# Patient Record
Sex: FEMALE | Race: AMERICAN INDIAN OR ALASKA NATIVE | NOT HISPANIC OR LATINO | Employment: UNEMPLOYED | URBAN - METROPOLITAN AREA
[De-identification: names, ages, dates, MRNs, and addresses within clinical notes are randomized per-mention and may not be internally consistent; named-entity substitution may affect disease eponyms.]

---

## 2017-03-20 ENCOUNTER — APPOINTMENT (OUTPATIENT)
Dept: GENERAL RADIOLOGY | Facility: CLINIC | Age: 27
End: 2017-03-20
Attending: NURSE PRACTITIONER
Payer: MEDICAID

## 2017-03-20 ENCOUNTER — APPOINTMENT (OUTPATIENT)
Dept: NUCLEAR MEDICINE | Facility: CLINIC | Age: 27
End: 2017-03-20
Attending: NURSE PRACTITIONER
Payer: MEDICAID

## 2017-03-20 ENCOUNTER — APPOINTMENT (OUTPATIENT)
Dept: ULTRASOUND IMAGING | Facility: CLINIC | Age: 27
End: 2017-03-20
Attending: EMERGENCY MEDICINE
Payer: MEDICAID

## 2017-03-20 ENCOUNTER — HOSPITAL ENCOUNTER (OUTPATIENT)
Facility: CLINIC | Age: 27
Setting detail: OBSERVATION
Discharge: HOME | End: 2017-03-22
Attending: EMERGENCY MEDICINE | Admitting: EMERGENCY MEDICINE
Payer: MEDICAID

## 2017-03-20 DIAGNOSIS — R11.2 INTRACTABLE VOMITING WITH NAUSEA, UNSPECIFIED VOMITING TYPE: ICD-10-CM

## 2017-03-20 DIAGNOSIS — K80.20 CHOLELITHIASIS WITHOUT CHOLECYSTITIS: ICD-10-CM

## 2017-03-20 DIAGNOSIS — R07.9 CHEST PAIN, UNSPECIFIED: ICD-10-CM

## 2017-03-20 DIAGNOSIS — R10.13 EPIGASTRIC PAIN: ICD-10-CM

## 2017-03-20 DIAGNOSIS — R10.11 RIGHT UPPER QUADRANT ABDOMINAL PAIN: Primary | ICD-10-CM

## 2017-03-20 PROBLEM — R10.9 ABDOMINAL PAIN: Status: ACTIVE | Noted: 2017-03-20

## 2017-03-20 LAB
ABO + RH BLD: NORMAL
ABO + RH BLD: NORMAL
ALBUMIN SERPL-MCNC: 3.5 G/DL (ref 3.4–5)
ALBUMIN SERPL-MCNC: 3.8 G/DL (ref 3.4–5)
ALBUMIN SERPL-MCNC: 3.9 G/DL (ref 3.4–5)
ALBUMIN UR-MCNC: NEGATIVE MG/DL
ALP SERPL-CCNC: 106 U/L (ref 40–150)
ALP SERPL-CCNC: 91 U/L (ref 40–150)
ALP SERPL-CCNC: 97 U/L (ref 40–150)
ALT SERPL W P-5'-P-CCNC: 21 U/L (ref 0–50)
ALT SERPL W P-5'-P-CCNC: 21 U/L (ref 0–50)
ALT SERPL W P-5'-P-CCNC: 27 U/L (ref 0–50)
ANION GAP SERPL CALCULATED.3IONS-SCNC: 10 MMOL/L (ref 3–14)
ANION GAP SERPL CALCULATED.3IONS-SCNC: 11 MMOL/L (ref 3–14)
ANION GAP SERPL CALCULATED.3IONS-SCNC: 7 MMOL/L (ref 3–14)
APPEARANCE UR: CLEAR
APTT PPP: 27 SEC (ref 22–37)
AST SERPL W P-5'-P-CCNC: 10 U/L (ref 0–45)
AST SERPL W P-5'-P-CCNC: 16 U/L (ref 0–45)
AST SERPL W P-5'-P-CCNC: 6 U/L (ref 0–45)
B-HCG SERPL-ACNC: <1 IU/L (ref 0–5)
BASOPHILS # BLD AUTO: 0 10E9/L (ref 0–0.2)
BASOPHILS NFR BLD AUTO: 0.1 %
BILIRUB SERPL-MCNC: 0.2 MG/DL (ref 0.2–1.3)
BILIRUB SERPL-MCNC: 0.3 MG/DL (ref 0.2–1.3)
BILIRUB SERPL-MCNC: 0.5 MG/DL (ref 0.2–1.3)
BILIRUB UR QL STRIP: NEGATIVE
BLD GP AB SCN SERPL QL: NORMAL
BLOOD BANK CMNT PATIENT-IMP: NORMAL
BUN SERPL-MCNC: 5 MG/DL (ref 7–30)
BUN SERPL-MCNC: 6 MG/DL (ref 7–30)
BUN SERPL-MCNC: 9 MG/DL (ref 7–30)
CALCIUM SERPL-MCNC: 8.2 MG/DL (ref 8.5–10.1)
CALCIUM SERPL-MCNC: 8.3 MG/DL (ref 8.5–10.1)
CALCIUM SERPL-MCNC: 9.3 MG/DL (ref 8.5–10.1)
CHLORIDE SERPL-SCNC: 106 MMOL/L (ref 94–109)
CHLORIDE SERPL-SCNC: 109 MMOL/L (ref 94–109)
CHLORIDE SERPL-SCNC: 109 MMOL/L (ref 94–109)
CO2 SERPL-SCNC: 22 MMOL/L (ref 20–32)
CO2 SERPL-SCNC: 23 MMOL/L (ref 20–32)
CO2 SERPL-SCNC: 23 MMOL/L (ref 20–32)
COLOR UR AUTO: ABNORMAL
CREAT SERPL-MCNC: 0.56 MG/DL (ref 0.52–1.04)
CREAT SERPL-MCNC: 0.65 MG/DL (ref 0.52–1.04)
CREAT SERPL-MCNC: 0.67 MG/DL (ref 0.52–1.04)
CRP SERPL-MCNC: 12 MG/L (ref 0–8)
CRP SERPL-MCNC: 3.1 MG/L (ref 0–8)
DIFFERENTIAL METHOD BLD: ABNORMAL
EOSINOPHIL # BLD AUTO: 0 10E9/L (ref 0–0.7)
EOSINOPHIL # BLD AUTO: 0 10E9/L (ref 0–0.7)
EOSINOPHIL # BLD AUTO: 0.2 10E9/L (ref 0–0.7)
EOSINOPHIL NFR BLD AUTO: 0 %
EOSINOPHIL NFR BLD AUTO: 0.1 %
EOSINOPHIL NFR BLD AUTO: 1.7 %
ERYTHROCYTE [DISTWIDTH] IN BLOOD BY AUTOMATED COUNT: 12.4 % (ref 10–15)
ERYTHROCYTE [DISTWIDTH] IN BLOOD BY AUTOMATED COUNT: 12.7 % (ref 10–15)
ERYTHROCYTE [DISTWIDTH] IN BLOOD BY AUTOMATED COUNT: 12.8 % (ref 10–15)
ETHANOL SERPL-MCNC: <0.01 G/DL
GFR SERPL CREATININE-BSD FRML MDRD: ABNORMAL ML/MIN/1.7M2
GLUCOSE SERPL-MCNC: 103 MG/DL (ref 70–99)
GLUCOSE SERPL-MCNC: 106 MG/DL (ref 70–99)
GLUCOSE SERPL-MCNC: 122 MG/DL (ref 70–99)
GLUCOSE UR STRIP-MCNC: NEGATIVE MG/DL
HCT VFR BLD AUTO: 38.9 % (ref 35–47)
HCT VFR BLD AUTO: 39.1 % (ref 35–47)
HCT VFR BLD AUTO: 41.4 % (ref 35–47)
HETEROPH AB SER QL: NEGATIVE
HGB BLD-MCNC: 13.2 G/DL (ref 11.7–15.7)
HGB BLD-MCNC: 13.3 G/DL (ref 11.7–15.7)
HGB BLD-MCNC: 14.3 G/DL (ref 11.7–15.7)
HGB UR QL STRIP: NEGATIVE
IMM GRANULOCYTES # BLD: 0 10E9/L (ref 0–0.4)
IMM GRANULOCYTES NFR BLD: 0.2 %
IMM GRANULOCYTES NFR BLD: 0.2 %
IMM GRANULOCYTES NFR BLD: 0.3 %
INR PPP: 1 (ref 0.86–1.14)
INR PPP: 1.05 (ref 0.86–1.14)
INTERPRETATION ECG - MUSE: NORMAL
KETONES UR STRIP-MCNC: NEGATIVE MG/DL
LACTATE BLD-SCNC: 1.6 MMOL/L (ref 0.7–2.1)
LEUKOCYTE ESTERASE UR QL STRIP: NEGATIVE
LIPASE SERPL-CCNC: 116 U/L (ref 73–393)
LIPASE SERPL-CCNC: 120 U/L (ref 73–393)
LIPASE SERPL-CCNC: 177 U/L (ref 73–393)
LYMPHOCYTES # BLD AUTO: 1.1 10E9/L (ref 0.8–5.3)
LYMPHOCYTES # BLD AUTO: 2.4 10E9/L (ref 0.8–5.3)
LYMPHOCYTES # BLD AUTO: 4.4 10E9/L (ref 0.8–5.3)
LYMPHOCYTES NFR BLD AUTO: 15.5 %
LYMPHOCYTES NFR BLD AUTO: 38.3 %
LYMPHOCYTES NFR BLD AUTO: 6 %
MCH RBC QN AUTO: 30.6 PG (ref 26.5–33)
MCH RBC QN AUTO: 30.9 PG (ref 26.5–33)
MCH RBC QN AUTO: 31.3 PG (ref 26.5–33)
MCHC RBC AUTO-ENTMCNC: 33.9 G/DL (ref 31.5–36.5)
MCHC RBC AUTO-ENTMCNC: 34 G/DL (ref 31.5–36.5)
MCHC RBC AUTO-ENTMCNC: 34.5 G/DL (ref 31.5–36.5)
MCV RBC AUTO: 90 FL (ref 78–100)
MCV RBC AUTO: 91 FL (ref 78–100)
MCV RBC AUTO: 91 FL (ref 78–100)
MONOCYTES # BLD AUTO: 0.3 10E9/L (ref 0–1.3)
MONOCYTES # BLD AUTO: 0.4 10E9/L (ref 0–1.3)
MONOCYTES # BLD AUTO: 0.7 10E9/L (ref 0–1.3)
MONOCYTES NFR BLD AUTO: 1.9 %
MONOCYTES NFR BLD AUTO: 3.4 %
MONOCYTES NFR BLD AUTO: 4.2 %
NEUTROPHILS # BLD AUTO: 12.3 10E9/L (ref 1.6–8.3)
NEUTROPHILS # BLD AUTO: 16.6 10E9/L (ref 1.6–8.3)
NEUTROPHILS # BLD AUTO: 6.4 10E9/L (ref 1.6–8.3)
NEUTROPHILS NFR BLD AUTO: 56.2 %
NEUTROPHILS NFR BLD AUTO: 79.9 %
NEUTROPHILS NFR BLD AUTO: 91.8 %
NITRATE UR QL: NEGATIVE
NRBC # BLD AUTO: 0 10*3/UL
NRBC BLD AUTO-RTO: 0 /100
PH UR STRIP: 6.5 PH (ref 5–7)
PLATELET # BLD AUTO: 322 10E9/L (ref 150–450)
PLATELET # BLD AUTO: 347 10E9/L (ref 150–450)
PLATELET # BLD AUTO: 376 10E9/L (ref 150–450)
POTASSIUM SERPL-SCNC: 3.5 MMOL/L (ref 3.4–5.3)
POTASSIUM SERPL-SCNC: 3.8 MMOL/L (ref 3.4–5.3)
POTASSIUM SERPL-SCNC: 3.8 MMOL/L (ref 3.4–5.3)
PROT SERPL-MCNC: 6.8 G/DL (ref 6.8–8.8)
PROT SERPL-MCNC: 7.4 G/DL (ref 6.8–8.8)
PROT SERPL-MCNC: 7.9 G/DL (ref 6.8–8.8)
RADIOLOGIST FLAGS: ABNORMAL
RBC # BLD AUTO: 4.3 10E12/L (ref 3.8–5.2)
RBC # BLD AUTO: 4.31 10E12/L (ref 3.8–5.2)
RBC # BLD AUTO: 4.57 10E12/L (ref 3.8–5.2)
SODIUM SERPL-SCNC: 138 MMOL/L (ref 133–144)
SODIUM SERPL-SCNC: 139 MMOL/L (ref 133–144)
SODIUM SERPL-SCNC: 142 MMOL/L (ref 133–144)
SP GR UR STRIP: 1 (ref 1–1.03)
SPECIMEN EXP DATE BLD: NORMAL
TROPONIN I BLD-MCNC: 0 UG/L (ref 0–0.1)
TROPONIN I SERPL-MCNC: NORMAL UG/L (ref 0–0.04)
URN SPEC COLLECT METH UR: ABNORMAL
UROBILINOGEN UR STRIP-MCNC: NORMAL MG/DL (ref 0–2)
WBC # BLD AUTO: 11.4 10E9/L (ref 4–11)
WBC # BLD AUTO: 15.4 10E9/L (ref 4–11)
WBC # BLD AUTO: 18.1 10E9/L (ref 4–11)

## 2017-03-20 PROCEDURE — 80307 DRUG TEST PRSMV CHEM ANLYZR: CPT | Mod: 91 | Performed by: NURSE PRACTITIONER

## 2017-03-20 PROCEDURE — 83605 ASSAY OF LACTIC ACID: CPT | Performed by: NURSE PRACTITIONER

## 2017-03-20 PROCEDURE — 96376 TX/PRO/DX INJ SAME DRUG ADON: CPT

## 2017-03-20 PROCEDURE — 80320 DRUG SCREEN QUANTALCOHOLS: CPT | Mod: 91 | Performed by: NURSE PRACTITIONER

## 2017-03-20 PROCEDURE — 25000128 H RX IP 250 OP 636: Performed by: NURSE PRACTITIONER

## 2017-03-20 PROCEDURE — 93005 ELECTROCARDIOGRAM TRACING: CPT | Performed by: EMERGENCY MEDICINE

## 2017-03-20 PROCEDURE — 25000128 H RX IP 250 OP 636: Performed by: RADIOLOGY

## 2017-03-20 PROCEDURE — 85610 PROTHROMBIN TIME: CPT | Performed by: EMERGENCY MEDICINE

## 2017-03-20 PROCEDURE — 85730 THROMBOPLASTIN TIME PARTIAL: CPT | Performed by: NURSE PRACTITIONER

## 2017-03-20 PROCEDURE — 80307 DRUG TEST PRSMV CHEM ANLYZR: CPT | Performed by: NURSE PRACTITIONER

## 2017-03-20 PROCEDURE — 99285 EMERGENCY DEPT VISIT HI MDM: CPT | Mod: 25 | Performed by: EMERGENCY MEDICINE

## 2017-03-20 PROCEDURE — 96374 THER/PROPH/DIAG INJ IV PUSH: CPT | Performed by: EMERGENCY MEDICINE

## 2017-03-20 PROCEDURE — 85025 COMPLETE CBC W/AUTO DIFF WBC: CPT | Mod: 91 | Performed by: NURSE PRACTITIONER

## 2017-03-20 PROCEDURE — 25000125 ZZHC RX 250: Performed by: NURSE PRACTITIONER

## 2017-03-20 PROCEDURE — 99207 ZZC APP CREDIT; MD BILLING SHARED VISIT: CPT | Mod: 25 | Performed by: EMERGENCY MEDICINE

## 2017-03-20 PROCEDURE — 34300033 ZZH RX 343: Performed by: FAMILY MEDICINE

## 2017-03-20 PROCEDURE — 96376 TX/PRO/DX INJ SAME DRUG ADON: CPT | Performed by: EMERGENCY MEDICINE

## 2017-03-20 PROCEDURE — 81003 URINALYSIS AUTO W/O SCOPE: CPT | Performed by: NURSE PRACTITIONER

## 2017-03-20 PROCEDURE — 87040 BLOOD CULTURE FOR BACTERIA: CPT | Performed by: NURSE PRACTITIONER

## 2017-03-20 PROCEDURE — 74000 XR ABDOMEN PORT F1 VW: CPT

## 2017-03-20 PROCEDURE — 78226 HEPATOBILIARY SYSTEM IMAGING: CPT

## 2017-03-20 PROCEDURE — 84484 ASSAY OF TROPONIN QUANT: CPT | Performed by: EMERGENCY MEDICINE

## 2017-03-20 PROCEDURE — 86900 BLOOD TYPING SEROLOGIC ABO: CPT | Performed by: NURSE PRACTITIONER

## 2017-03-20 PROCEDURE — 36415 COLL VENOUS BLD VENIPUNCTURE: CPT | Performed by: NURSE PRACTITIONER

## 2017-03-20 PROCEDURE — 96375 TX/PRO/DX INJ NEW DRUG ADDON: CPT

## 2017-03-20 PROCEDURE — 85025 COMPLETE CBC W/AUTO DIFF WBC: CPT | Performed by: EMERGENCY MEDICINE

## 2017-03-20 PROCEDURE — 96361 HYDRATE IV INFUSION ADD-ON: CPT | Performed by: EMERGENCY MEDICINE

## 2017-03-20 PROCEDURE — 25000128 H RX IP 250 OP 636: Performed by: EMERGENCY MEDICINE

## 2017-03-20 PROCEDURE — 84702 CHORIONIC GONADOTROPIN TEST: CPT | Performed by: NURSE PRACTITIONER

## 2017-03-20 PROCEDURE — 25000128 H RX IP 250 OP 636

## 2017-03-20 PROCEDURE — 86140 C-REACTIVE PROTEIN: CPT | Performed by: NURSE PRACTITIONER

## 2017-03-20 PROCEDURE — 84484 ASSAY OF TROPONIN QUANT: CPT

## 2017-03-20 PROCEDURE — A9537 TC99M MEBROFENIN: HCPCS | Performed by: FAMILY MEDICINE

## 2017-03-20 PROCEDURE — 86901 BLOOD TYPING SEROLOGIC RH(D): CPT | Performed by: NURSE PRACTITIONER

## 2017-03-20 PROCEDURE — 85610 PROTHROMBIN TIME: CPT | Mod: 91 | Performed by: NURSE PRACTITIONER

## 2017-03-20 PROCEDURE — 96375 TX/PRO/DX INJ NEW DRUG ADDON: CPT | Performed by: EMERGENCY MEDICINE

## 2017-03-20 PROCEDURE — 83690 ASSAY OF LIPASE: CPT | Performed by: NURSE PRACTITIONER

## 2017-03-20 PROCEDURE — 80053 COMPREHEN METABOLIC PANEL: CPT | Performed by: EMERGENCY MEDICINE

## 2017-03-20 PROCEDURE — 99219 ZZC INITIAL OBSERVATION CARE,LEVL II: CPT | Mod: Z6 | Performed by: NURSE PRACTITIONER

## 2017-03-20 PROCEDURE — 80053 COMPREHEN METABOLIC PANEL: CPT | Mod: 91 | Performed by: NURSE PRACTITIONER

## 2017-03-20 PROCEDURE — 86850 RBC ANTIBODY SCREEN: CPT | Performed by: NURSE PRACTITIONER

## 2017-03-20 PROCEDURE — 83690 ASSAY OF LIPASE: CPT | Performed by: EMERGENCY MEDICINE

## 2017-03-20 PROCEDURE — 76705 ECHO EXAM OF ABDOMEN: CPT

## 2017-03-20 PROCEDURE — 93010 ELECTROCARDIOGRAM REPORT: CPT | Mod: Z6 | Performed by: EMERGENCY MEDICINE

## 2017-03-20 PROCEDURE — 25000125 ZZHC RX 250: Performed by: EMERGENCY MEDICINE

## 2017-03-20 PROCEDURE — G0378 HOSPITAL OBSERVATION PER HR: HCPCS

## 2017-03-20 PROCEDURE — 86308 HETEROPHILE ANTIBODY SCREEN: CPT | Performed by: EMERGENCY MEDICINE

## 2017-03-20 PROCEDURE — 96365 THER/PROPH/DIAG IV INF INIT: CPT

## 2017-03-20 RX ORDER — MORPHINE SULFATE 2 MG/ML
2 INJECTION, SOLUTION INTRAMUSCULAR; INTRAVENOUS ONCE
Status: COMPLETED | OUTPATIENT
Start: 2017-03-20 | End: 2017-03-20

## 2017-03-20 RX ORDER — NALOXONE HYDROCHLORIDE 0.4 MG/ML
.1-.4 INJECTION, SOLUTION INTRAMUSCULAR; INTRAVENOUS; SUBCUTANEOUS
Status: DISCONTINUED | OUTPATIENT
Start: 2017-03-20 | End: 2017-03-22 | Stop reason: HOSPADM

## 2017-03-20 RX ORDER — MORPHINE SULFATE 2 MG/ML
1 INJECTION, SOLUTION INTRAMUSCULAR; INTRAVENOUS
Status: DISCONTINUED | OUTPATIENT
Start: 2017-03-20 | End: 2017-03-20

## 2017-03-20 RX ORDER — SODIUM CHLORIDE 9 MG/ML
1000 INJECTION, SOLUTION INTRAVENOUS CONTINUOUS
Status: DISCONTINUED | OUTPATIENT
Start: 2017-03-20 | End: 2017-03-20

## 2017-03-20 RX ORDER — HYDROCODONE BITARTRATE AND ACETAMINOPHEN 5; 325 MG/1; MG/1
1-2 TABLET ORAL EVERY 4 HOURS PRN
Qty: 15 TABLET | Refills: 0 | Status: SHIPPED | OUTPATIENT
Start: 2017-03-20 | End: 2017-03-22

## 2017-03-20 RX ORDER — ONDANSETRON 2 MG/ML
4 INJECTION INTRAMUSCULAR; INTRAVENOUS EVERY 30 MIN PRN
Status: COMPLETED | OUTPATIENT
Start: 2017-03-20 | End: 2017-03-20

## 2017-03-20 RX ORDER — ACETAMINOPHEN 10 MG/ML
1000 INJECTION, SOLUTION INTRAVENOUS ONCE
Status: COMPLETED | OUTPATIENT
Start: 2017-03-20 | End: 2017-03-20

## 2017-03-20 RX ORDER — ONDANSETRON 4 MG/1
4 TABLET, ORALLY DISINTEGRATING ORAL EVERY 8 HOURS PRN
Qty: 10 TABLET | Refills: 0 | Status: SHIPPED | OUTPATIENT
Start: 2017-03-20 | End: 2017-03-23

## 2017-03-20 RX ORDER — ONDANSETRON 2 MG/ML
INJECTION INTRAMUSCULAR; INTRAVENOUS
Status: COMPLETED
Start: 2017-03-20 | End: 2017-03-20

## 2017-03-20 RX ORDER — POLYETHYLENE GLYCOL 3350 17 G/17G
17 POWDER, FOR SOLUTION ORAL DAILY
Status: DISCONTINUED | OUTPATIENT
Start: 2017-03-20 | End: 2017-03-22 | Stop reason: HOSPADM

## 2017-03-20 RX ORDER — HYDROMORPHONE HCL/0.9% NACL/PF 0.2MG/0.2
0.2 SYRINGE (ML) INTRAVENOUS ONCE
Status: COMPLETED | OUTPATIENT
Start: 2017-03-20 | End: 2017-03-20

## 2017-03-20 RX ORDER — PIPERACILLIN SODIUM, TAZOBACTAM SODIUM 3; .375 G/15ML; G/15ML
3.38 INJECTION, POWDER, LYOPHILIZED, FOR SOLUTION INTRAVENOUS EVERY 6 HOURS
Status: DISCONTINUED | OUTPATIENT
Start: 2017-03-20 | End: 2017-03-21

## 2017-03-20 RX ORDER — SODIUM CHLORIDE 9 MG/ML
INJECTION, SOLUTION INTRAVENOUS
Status: COMPLETED
Start: 2017-03-20 | End: 2017-03-20

## 2017-03-20 RX ORDER — ONDANSETRON 2 MG/ML
4 INJECTION INTRAMUSCULAR; INTRAVENOUS EVERY 6 HOURS PRN
Status: DISCONTINUED | OUTPATIENT
Start: 2017-03-20 | End: 2017-03-22 | Stop reason: HOSPADM

## 2017-03-20 RX ORDER — NALOXONE HYDROCHLORIDE 0.4 MG/ML
.1-.4 INJECTION, SOLUTION INTRAMUSCULAR; INTRAVENOUS; SUBCUTANEOUS
Status: DISCONTINUED | OUTPATIENT
Start: 2017-03-20 | End: 2017-03-20

## 2017-03-20 RX ORDER — HYDROMORPHONE HYDROCHLORIDE 1 MG/ML
0.5 INJECTION, SOLUTION INTRAMUSCULAR; INTRAVENOUS; SUBCUTANEOUS
Status: DISCONTINUED | OUTPATIENT
Start: 2017-03-20 | End: 2017-03-21

## 2017-03-20 RX ORDER — SODIUM CHLORIDE 9 MG/ML
INJECTION, SOLUTION INTRAVENOUS CONTINUOUS
Status: DISCONTINUED | OUTPATIENT
Start: 2017-03-20 | End: 2017-03-22

## 2017-03-20 RX ORDER — TRAMADOL HYDROCHLORIDE 50 MG/1
50-100 TABLET ORAL EVERY 6 HOURS PRN
Status: DISCONTINUED | OUTPATIENT
Start: 2017-03-20 | End: 2017-03-22 | Stop reason: HOSPADM

## 2017-03-20 RX ORDER — ONDANSETRON 4 MG/1
4 TABLET, ORALLY DISINTEGRATING ORAL EVERY 6 HOURS PRN
Status: DISCONTINUED | OUTPATIENT
Start: 2017-03-20 | End: 2017-03-22 | Stop reason: HOSPADM

## 2017-03-20 RX ORDER — LIDOCAINE 40 MG/G
CREAM TOPICAL
Status: DISCONTINUED | OUTPATIENT
Start: 2017-03-20 | End: 2017-03-21

## 2017-03-20 RX ORDER — HYDROMORPHONE HYDROCHLORIDE 1 MG/ML
0.5 INJECTION, SOLUTION INTRAMUSCULAR; INTRAVENOUS; SUBCUTANEOUS
Status: DISCONTINUED | OUTPATIENT
Start: 2017-03-20 | End: 2017-03-20 | Stop reason: DRUGHIGH

## 2017-03-20 RX ORDER — KIT FOR THE PREPARATION OF TECHNETIUM TC 99M MEBROFENIN 45 MG/10ML
4.8-7.2 INJECTION, POWDER, LYOPHILIZED, FOR SOLUTION INTRAVENOUS ONCE
Status: COMPLETED | OUTPATIENT
Start: 2017-03-20 | End: 2017-03-20

## 2017-03-20 RX ORDER — HYDROMORPHONE HYDROCHLORIDE 1 MG/ML
0.5 INJECTION, SOLUTION INTRAMUSCULAR; INTRAVENOUS; SUBCUTANEOUS
Status: COMPLETED | OUTPATIENT
Start: 2017-03-20 | End: 2017-03-20

## 2017-03-20 RX ADMIN — MEBROFENIN 5.5 MCI.: 45 INJECTION, POWDER, LYOPHILIZED, FOR SOLUTION INTRAVENOUS at 16:00

## 2017-03-20 RX ADMIN — PANTOPRAZOLE SODIUM 40 MG: 40 INJECTION, POWDER, FOR SOLUTION INTRAVENOUS at 20:22

## 2017-03-20 RX ADMIN — Medication 1000 ML: at 06:11

## 2017-03-20 RX ADMIN — SODIUM CHLORIDE 1000 ML: 9 INJECTION, SOLUTION INTRAVENOUS at 12:16

## 2017-03-20 RX ADMIN — HYDROMORPHONE HYDROCHLORIDE 0.5 MG: 10 INJECTION, SOLUTION INTRAMUSCULAR; INTRAVENOUS; SUBCUTANEOUS at 20:27

## 2017-03-20 RX ADMIN — HYDROMORPHONE HYDROCHLORIDE 0.5 MG: 10 INJECTION, SOLUTION INTRAMUSCULAR; INTRAVENOUS; SUBCUTANEOUS at 08:39

## 2017-03-20 RX ADMIN — HYDROMORPHONE HYDROCHLORIDE 0.2 MG: 10 INJECTION, SOLUTION INTRAMUSCULAR; INTRAVENOUS; SUBCUTANEOUS at 12:13

## 2017-03-20 RX ADMIN — SODIUM CHLORIDE: 9 INJECTION, SOLUTION INTRAVENOUS at 13:45

## 2017-03-20 RX ADMIN — HYDROMORPHONE HYDROCHLORIDE 0.5 MG: 10 INJECTION, SOLUTION INTRAMUSCULAR; INTRAVENOUS; SUBCUTANEOUS at 07:37

## 2017-03-20 RX ADMIN — SODIUM CHLORIDE 1000 ML: 9 INJECTION, SOLUTION INTRAVENOUS at 08:30

## 2017-03-20 RX ADMIN — ONDANSETRON 4 MG: 2 INJECTION INTRAMUSCULAR; INTRAVENOUS at 06:11

## 2017-03-20 RX ADMIN — PIPERACILLIN AND TAZOBACTAM 3.38 G: 3; .375 INJECTION, POWDER, LYOPHILIZED, FOR SOLUTION INTRAVENOUS; PARENTERAL at 20:16

## 2017-03-20 RX ADMIN — ONDANSETRON 4 MG: 2 INJECTION INTRAMUSCULAR; INTRAVENOUS at 10:34

## 2017-03-20 RX ADMIN — HYDROMORPHONE HYDROCHLORIDE 0.5 MG: 10 INJECTION, SOLUTION INTRAMUSCULAR; INTRAVENOUS; SUBCUTANEOUS at 10:32

## 2017-03-20 RX ADMIN — SODIUM CHLORIDE 1000 ML: 9 INJECTION, SOLUTION INTRAVENOUS at 06:11

## 2017-03-20 RX ADMIN — MORPHINE SULFATE 2 MG: 2 INJECTION, SOLUTION INTRAMUSCULAR; INTRAVENOUS at 17:17

## 2017-03-20 RX ADMIN — PANTOPRAZOLE SODIUM 40 MG: 40 INJECTION, POWDER, FOR SOLUTION INTRAVENOUS at 08:06

## 2017-03-20 RX ADMIN — ACETAMINOPHEN 1000 MG: 10 INJECTION, SOLUTION INTRAVENOUS at 15:09

## 2017-03-20 RX ADMIN — ONDANSETRON 4 MG: 2 INJECTION INTRAMUSCULAR; INTRAVENOUS at 06:56

## 2017-03-20 RX ADMIN — HYDROMORPHONE HYDROCHLORIDE 0.5 MG: 10 INJECTION, SOLUTION INTRAMUSCULAR; INTRAVENOUS; SUBCUTANEOUS at 06:17

## 2017-03-20 ASSESSMENT — ENCOUNTER SYMPTOMS
SENSORY CHANGE: 0
LOSS OF CONSCIOUSNESS: 0
BLOOD IN STOOL: 0
FREQUENCY: 0
ABDOMINAL PAIN: 1
FEVER: 0
HEMOPTYSIS: 0
HEARTBURN: 1
EYE PAIN: 0
DOUBLE VISION: 0
SPEECH CHANGE: 0
DYSURIA: 0
HEADACHES: 0
MYALGIAS: 0
DIARRHEA: 0
DIAPHORESIS: 0
FOCAL WEAKNESS: 0
BRUISES/BLEEDS EASILY: 0
TREMORS: 0
WEIGHT LOSS: 0
PALPITATIONS: 0
DIZZINESS: 0
SEIZURES: 0
CHILLS: 0
CONSTIPATION: 1
NAUSEA: 1
WEAKNESS: 0
COUGH: 0
POLYDIPSIA: 0
VOMITING: 1
HEMATURIA: 0
FLANK PAIN: 0

## 2017-03-20 ASSESSMENT — LIFESTYLE VARIABLES: SUBSTANCE_ABUSE: 0

## 2017-03-20 ASSESSMENT — PAIN DESCRIPTION - DESCRIPTORS: DESCRIPTORS: ACHING;CONSTANT

## 2017-03-20 NOTE — CONSULTS
GENERAL SURGERY HISTORY AND PHYSICAL    ASSESSMENT/PLAN: Candi Saez is a 27-year-old female with epigastric abdominal pain and a history of gastritis. Given ibuprofen use and gastritis history, peptic ulcer disease is on the differential. On exam, she is tender in both upper quadrants. Her gallbladder is normal appearing on ultrasound. Given these findings, acute cholecystitis is less likely. Biliary colic is still possible given gallstones on ultrasound.    -- Trend CBC and LFTs  -- Recommend gastroenterology consult for EGD to evaluate for peptic ulcer disease  -- Will follow results of HIDA scan; would still recommend GI evaluation given false positive rate of HIDA  -- Keep NPO for possible procedures tomorrow  -- Continue PRN pain control, IVF, antiemetics PRN  -- Surgery will follow    Discussed with Dr. Castillo.    Damien Najera MD  PGY-2, General Surgery  Pager: [phone]    =======================================  ADDENDUM:    Pt with + HIDA scan, obstructed cystic duct. By definition, this is acute cholecystitis.   She is added on to the OR schedule for tomorrow, timing TBD.   She is already on Zosyn.  Will obtain informed consent.   General Surgery will resume as primary team going forward.     Discussed with Dr. Castillo, staff surgeon.    Alvaro Watts MD  General Surgery PGY-2  Pager [phone]        - - - - - - - - - -    CHIEF COMPLAINT: abdominal pain    HISTORY OF PRESENT ILLNESS: Candi Saez is a 27-year-old female with burning epigastric pain since 2am this morning. The pain started as acid reflux and progressed to burning epigastric pain. The pain radiates to her back. She felt nauseated and vomited once at home and once in the ED. The pain is now located in both upper quadrants and her epigastrium. She has previously had pain like this and was diagnosed with gastritis on EGD. She tried taking ranitidine and omeprazole at home but this did not help. She denies bloody emesis.  She reports taking 400 mg of ibuprofen at least three times per week due to aches and pains from her job as a . She denies diarrhea. She reports constipation. She denies fevers, chills.     REVIEW OF SYSTEMS: A 10 point review of systems was asked and negative except as above.     PAST MEDICAL HISTORY:   Gastritis    SURGICAL HISTORY:   History reviewed. No pertinent past surgical history.    SOCIAL HISTORY:   Social History     Social History     Marital status: Single     Spouse name: N/A     Number of children: N/A     Years of education: N/A     Social History Main Topics     Smoking status: Former Smoker     Years: 0.50     Smokeless tobacco: Never Used     Alcohol use Yes      Comment: occ     Drug use: No     Sexual activity: Yes     Partners: Male     Birth control/ protection: Injection     Other Topics Concern     None     Social History Narrative       FAMILY HISTORY:   Family History   Problem Relation Age of Onset     DIABETES Paternal Grandmother      Hypertension Paternal Grandmother      CEREBROVASCULAR DISEASE Paternal Grandmother    Reports history of DVT in her sister and grandmother.     ALLERGIES:    No Known Allergies    MEDICATIONS:    No current facility-administered medications on file prior to encounter.   Current Outpatient Prescriptions on File Prior to Encounter:  omeprazole (PRILOSEC) 40 MG capsule Take 1 capsule (40 mg) by mouth daily Take 30-60 minutes before a meal.   diphenoxylate-atropine (LOMOTIL) 2.5-0.025 MG per tablet Take 1-2 tablets by mouth 4 times daily as needed for diarrhea   ondansetron (ZOFRAN-ODT) 8 MG disintegrating tablet Take 8 mg by mouth every 8 hours as needed for nausea   ibuprofen (ADVIL,MOTRIN) 200 MG tablet Take 400 mg by mouth every 6 hours as needed for mild pain   loratadine (CLARITIN) 10 MG tablet Take 10 mg by mouth daily as needed for allergies   diphenhydrAMINE HCl 50 MG TABS Take 25-50 mg by mouth every 6 hours as needed (allergies)    multivitamin, therapeutic with minerals (THERA-VIT-M) TABS Take 1 tablet by mouth daily   sucralfate (CARAFATE) 1 GM tablet Take 1 tablet (1 g) by mouth 4 times daily   acetaminophen (TYLENOL) 500 MG tablet Take 2 tablets (1,000 mg) by mouth every 8 hours as needed for fever or pain   Estradiol Cypionate (DEPO-ESTRADIOL IM) Inject into the muscle every 3 months        PHYSICAL EXAM:   /73  Pulse 78  Temp 98  F (36.7  C) (Oral)  Resp 18  Wt 95.3 kg (210 lb)  SpO2 99%  Breastfeeding? No  BMI 36.05 kg/m2  General: Somnolent female in moderate distress.  HEENT: Normocephalic, atraumatic. Patent nares. EOMI. PERRLA.  Chest wall: Symmetric thorax.   Respiratory: Non-labored breathing on room air.  Cardiovascular: Regular rate and rhythm.   Gastrointestinal: Abdomen soft, non-distended, tender to palpation at epigastrium, LUQ, and RUQ. No rebound or guarding.   Extremities: Moving all four extremities. No limb deformities. No pedal edema. Peripheral pulses palpable in all distal extremities.  Skin: No rashes or lesions appreciated.    LAB DATA: Reviewed.   BMP WNL  Albumin 3.9  T bili 0.2  Alk phos 106  ALT 27  AST 16  Lipase 177  Troponin <0.015  Glucose 106  WBC 11.4  Hgb 14.3  Plt 376  INR 1.00    IMAGING:   US abdomen 3/20/2017:  FINDINGS: The liver is normal in size and texture without focal mass.  There is no intra or extrahepatic biliary dilatation. The common  hepatic duct measures 0.4 cm. There are stones in the gallbladder. The  gallbladder otherwise appears normal. The pancreas head and body  appear normal. The tail is obscured by bowel gas. The right kidney  measures 10.6 cm and is normal in appearance.   IMPRESSION: Cholelithiasis. There is no biliary dilatation or evidence  of cholecystitis.

## 2017-03-20 NOTE — PROGRESS NOTES
HIDA scan scheduled for 4 pm. Keep NPO. Hold all morphine derivatives per nuc med.  Relayed above information to patient's primary RN.      Armida Caputo APRN, CNP  ED Observation Unit  [facility]

## 2017-03-20 NOTE — CONSULTS
GASTROENTEROLOGY CONSULTATION      Date of Admission:  3/20/2017          Chief Complaint:   We were asked by Armida Caputo CNP from emergency medicine to evaluate this patient with epigastric pain abdominal pain.         ASSESSMENT AND RECOMMENDATIONS:   Assessment:  Candi Saez is a 27 year old female with a history of asthma, chronic anemia, and now presenting with epigastric pain radiating to RUQ, n/v and leukocytosis. Abdominal US showed cholelithiasis w/o cholecystitis or biliary dilation. Lipase and LFTs are normal. Abdominal x-ray did not show acute abdomen.    Patient's sudden pain after greasy food and leukocytosis in the setting of gallstones is consistent obstruction versus infection on our differential.      Recommendations  -- HIDA scan for elevated white count, cholelithiasis evidenced by the abdominal US and RUQ abdominal pain  -- Infectious work up (H. Pylori, giardia, enteric pathogen, C. Diff, ova and parasite)  -- Miralax and enema to clean stool seen on abdominal x-ray  -- Clear liquid diet  -- PPI once daily     Gastroenterology follow up recommendations: To be determined      Patient care plan discussed with Dr. Rodriguez, GI staff physician. Thank you for involving us in this patient's care. Please do not hesitate to contact the GI service with any questions or concerns.     Cata Herrera CNP  Department of Gastroenterology   -------------------------------------------------------------------------------------------------------------------   History is obtained from the patient and the medical record.          History of Present Illness:   Candi Saez is a 27 year old female with a history of asthma, chronic anemia, and now presenting with epigastric pain radiating to RUQ, n/v and leukocytosis. Abdominal US showed cholelithiasis w/o cholecystitis or biliary dilation. Lipase and LFTs are normal. Abdominal x-ray did not show acute abdomen.    Epigastric pain started  gradually last night after eating a pizza and continued to get worse, which kept her awake. Then later she developed nausea and started throwing up. Patient is unable to answer questions as she is in pain at this moment.        Past Medical History:   Reviewed and edited as appropriate  Past Medical History   Diagnosis Date     Anemia      Asthma             Past Surgical History:   Reviewed and edited as appropriate   History reviewed. No pertinent past surgical history.         Previous Endoscopy:   No results found for this or any previous visit.         Social History:   Reviewed and edited as appropriate  Social History     Social History     Marital status: Single     Spouse name: N/A     Number of children: N/A     Years of education: N/A     Occupational History     Not on file.     Social History Main Topics     Smoking status: Former Smoker     Years: 0.50     Smokeless tobacco: Never Used     Alcohol use Yes      Comment: occ     Drug use: No     Sexual activity: Yes     Partners: Male     Birth control/ protection: Injection     Other Topics Concern     Not on file     Social History Narrative            Family History:   Reviewed and edited as appropriate  Family History   Problem Relation Age of Onset     DIABETES Paternal Grandmother      Hypertension Paternal Grandmother      CEREBROVASCULAR DISEASE Paternal Grandmother            Allergies:   Reviewed and edited as appropriate   No Known Allergies         Medications:     Prescriptions Prior to Admission   Medication Sig Dispense Refill Last Dose     RaNITidine HCl (ZANTAC PO) Take 150 mg by mouth daily as needed for heartburn   3/20/2017 at 0130     omeprazole (PRILOSEC) 40 MG capsule Take 1 capsule (40 mg) by mouth daily Take 30-60 minutes before a meal. 30 capsule 1 3/20/2017 at Unknown time     diphenoxylate-atropine (LOMOTIL) 2.5-0.025 MG per tablet Take 1-2 tablets by mouth 4 times daily as needed for diarrhea   Unknown at Unknown time      ondansetron (ZOFRAN-ODT) 8 MG disintegrating tablet Take 8 mg by mouth every 8 hours as needed for nausea   More than a month at Unknown time     ibuprofen (ADVIL,MOTRIN) 200 MG tablet Take 400 mg by mouth every 6 hours as needed for mild pain   More than a month at Unknown time     loratadine (CLARITIN) 10 MG tablet Take 10 mg by mouth daily as needed for allergies   More than a month at Unknown time     diphenhydrAMINE HCl 50 MG TABS Take 25-50 mg by mouth every 6 hours as needed (allergies)   More than a month at Unknown time     multivitamin, therapeutic with minerals (THERA-VIT-M) TABS Take 1 tablet by mouth daily   More than a month at Unknown time     sucralfate (CARAFATE) 1 GM tablet Take 1 tablet (1 g) by mouth 4 times daily 40 tablet 1 More than a month at Unknown time     acetaminophen (TYLENOL) 500 MG tablet Take 2 tablets (1,000 mg) by mouth every 8 hours as needed for fever or pain 60 tablet 0 More than a month at Unknown time     Estradiol Cypionate (DEPO-ESTRADIOL IM) Inject into the muscle every 3 months    11/17/2015             Review of Systems:   A complete review of systems was performed and is negative except as noted in the HPI           Physical Exam:   /73  Pulse 78  Temp 98  F (36.7  C) (Oral)  Resp 18  Wt 95.3 kg (210 lb)  SpO2 99%  Breastfeeding? No  BMI 36.05 kg/m2  Wt:   Wt Readings from Last 2 Encounters:   03/20/17 95.3 kg (210 lb)   02/23/16 90.7 kg (200 lb)      Constitutional: cooperative, pleasant, not dyspneic/diaphoretic, no acute distress  Eyes: Sclera anicteric/injected  Ears/nose/mouth/throat: Normal oropharynx without ulcers or exudate, mucus membranes moist, hearing intact  Neck: supple, thyroid normal size  CV: RRR. No edema in LE.  Respiratory: Unlabored breathing. CTA bilaterally.  Lymph: No axillary, submandibular, supraclavicular or inguinal lymphadenopathy  Abd: Nondistended, hypoactive bs, no hepatosplenomegaly. Tenderness in epigastric region and RUQ.  Positive for rebound.  Skin: warm, perfused, no jaundice  Neuro: AAO x 3, No asterixis  Psych: Normal affect  MSK: Normal gait         Data:   Labs and imaging below were independently reviewed and interpreted    BMP  Recent Labs  Lab 03/20/17  1310 03/20/17  0612    142   POTASSIUM 3.8 3.8   CHLORIDE 106 109   FRANCINE 8.2* 9.3   CO2 22 23   BUN 6* 9   CR 0.56 0.67   * 106*     CBC  Recent Labs  Lab 03/20/17  1310 03/20/17  0612   WBC 18.1* 11.4*   RBC 4.30 4.57   HGB 13.3 14.3   HCT 39.1 41.4   MCV 91 91   MCH 30.9 31.3   MCHC 34.0 34.5   RDW 12.8 12.4    376     INR  Recent Labs  Lab 03/20/17  1310 03/20/17  0612   INR 1.05 1.00     LFTs  Recent Labs  Lab 03/20/17  1310 03/20/17  0612   ALKPHOS 97 106   AST 10 16   ALT 21 27   BILITOTAL 0.3 0.2   PROTTOTAL 7.4 7.9   ALBUMIN 3.8 3.9      PANC  Recent Labs  Lab 03/20/17  1310 03/20/17  0612   LIPASE 120 177       Imaging:

## 2017-03-20 NOTE — ED PROVIDER NOTES
History     Chief Complaint   Patient presents with     Chest Pain     Chest and upper abdominal pain that started at 0200. Took Omeprazole with no effect. Constant pain.     HPI  Candi Saez is a 27 year old female who presents to the ER for central chest to epigastric pain.  Patient reports that pain had started in the evening possibility after dinner however was very mild.  She had gone to bed but could not sleep due to the pain and woke or got up suddenly at about 2 AM with severe pain in that area.  She also felt nauseous but had no immediate.  Patient vomited while in the ER.  She denies any fevers chills, no urinary symptoms.  She reports that she's had pain there before which has been diagnosed as gastritis last year and she's been taking omeprazole.  She did try taking omeprazole at home however this made the nausea worse.  She reports no dark or bloody stools.  The pain is not burning but more of a sharp cyclic pain in her upper abdomen.    I have reviewed the Medications, Allergies, Past Medical and Surgical History, and Social History in the [employer] system.  Past Medical History   Diagnosis Date     Anemia      Asthma        History reviewed. No pertinent past surgical history.    Family History   Problem Relation Age of Onset     DIABETES Paternal Grandmother      Hypertension Paternal Grandmother      CEREBROVASCULAR DISEASE Paternal Grandmother        Social History   Substance Use Topics     Smoking status: Former Smoker     Years: 0.50     Smokeless tobacco: Never Used     Alcohol use Yes      Comment: occ      No Known Allergies    Current Facility-Administered Medications   Medication     lidocaine 1 % 1 mL     lidocaine (LMX4) kit     sodium chloride (PF) 0.9% PF flush 3 mL     sodium chloride (PF) 0.9% PF flush 3 mL     0.9% sodium chloride infusion     HYDROmorphone (PF) (DILAUDID) injection 0.5 mg     ondansetron (ZOFRAN) injection 4 mg     pantoprazole (PROTONIX) 40 mg IV push injection      HYDROmorphone (PF) (DILAUDID) injection 0.5 mg     Current Outpatient Prescriptions   Medication     HYDROcodone-acetaminophen (NORCO) 5-325 MG per tablet     ondansetron (ZOFRAN ODT) 4 MG ODT tab     omeprazole (PRILOSEC) 40 MG capsule     diphenoxylate-atropine (LOMOTIL) 2.5-0.025 MG per tablet     ondansetron (ZOFRAN-ODT) 8 MG disintegrating tablet     ibuprofen (ADVIL,MOTRIN) 200 MG tablet     loratadine (CLARITIN) 10 MG tablet     diphenhydrAMINE HCl 50 MG TABS     multivitamin, therapeutic with minerals (THERA-VIT-M) TABS     sucralfate (CARAFATE) 1 GM tablet     acetaminophen (TYLENOL) 500 MG tablet     Estradiol Cypionate (DEPO-ESTRADIOL IM)     Review of Systems   Gastrointestinal: Positive for abdominal pain, nausea and vomiting.   All other systems reviewed and are negative.      Physical Exam   BP: 117/90  Pulse: 76  Heart Rate: 82  Temp: 97.5  F (36.4  C)  Resp: 20  Weight: 95.3 kg (210 lb)  SpO2: 97 %  Physical Exam   Constitutional: She appears well-developed and well-nourished. She appears distressed (Secondary to pain and nausea).   HENT:   Head: Normocephalic and atraumatic.   Eyes: Conjunctivae are normal. No scleral icterus.   Cardiovascular: Normal rate, regular rhythm and normal heart sounds.    Pulmonary/Chest: Effort normal and breath sounds normal. No respiratory distress. She has no wheezes. She has no rales.   Abdominal: Soft. She exhibits no distension. There is tenderness. There is guarding. There is no rebound.   Musculoskeletal: Normal range of motion.   Neurological: She is alert.   Skin: Skin is warm and dry. She is not diaphoretic.   Psychiatric: She has a normal mood and affect.   Nursing note and vitals reviewed.    ED Course     ED Course     Procedures             EKG Interpretation:      Interpreted by Domenico Blank  Time reviewed:0612   Symptoms at time of EKG: chest/epigastric pain   Rhythm: normal sinus   Rate: normal  Axis: NORMAL  Ectopy: none  Conduction:  normal  ST Segments/ T Waves: No ST-T wave changes  Q Waves: none  Comparison to prior: No old EKG available    Clinical Impression: normal EKG      Critical Care time:  none        Labs Ordered and Resulted from Time of ED Arrival Up to the Time of Departure from the ED   CBC WITH PLATELETS DIFFERENTIAL - Abnormal; Notable for the following:        Result Value    WBC 11.4 (*)     All other components within normal limits   COMPREHENSIVE METABOLIC PANEL - Abnormal; Notable for the following:     Glucose 106 (*)     All other components within normal limits   INR   LIPASE   TROPONIN I   ISTAT TROPONIN NURSING POCT   PULSE OXIMETRY NURSING   PERIPHERAL IV CATHETER   TROPONIN POCT     Us Abdomen Limited    Result Date: 3/20/2017  ULTRASOUND ABDOMEN LIMITED March 20, 2017 7:28 AM  HISTORY: Right upper quadrant pain. COMPARISON: None. FINDINGS: The liver is normal in size and texture without focal mass. There is no intra or extrahepatic biliary dilatation. The common hepatic duct measures 0.4 cm. There are stones in the gallbladder. The gallbladder otherwise appears normal.  The pancreas head and body appear normal. The tail is obscured by bowel gas.  The right kidney measures 10.6 cm and is normal in appearance.     IMPRESSION: Cholelithiasis. There is no biliary dilatation or evidence of cholecystitis. YAMIL STREETER MD    Assessments & Plan (with Medical Decision Making)   I was physically present and have reviewed and verified the accuracy of this note documented by (myself).     Disclaimer: This note consists of symbols derived from keyboarding, dictation, and/or voice recognition software. As a result, there may be errors in the script that have gone undetected.  Please consider this when interpreting information found in the chart.These sections of the chart were reviewed for accuracy to the best of my knowledge and ability.    Patient was clinically assessed and consented to treatment. After assessment, medical  decision making and workup were discussed with the patient. The patient was agreeable to plan for testing, workup, and treatment.  Candi Saez is a 27 year old female who presents today for upper abdominal pain and chest pain.  Patient has a history of gastritis however pain is more severe and suspect possibly cholecystitis.  Patient may also have biliary colic or cholelithiasis.  Patient also could have pancreatitis less likely given her history of alcohol or known gallstones.  Labs were obtained that did show white blood count 11.4.  No elevation of LFTs or lipase.  Troponin was negative and EKG was negative which acute cord syndrome was of low suspicion.  Ultrasound was obtained that did show cholelithiasis.  Patient was still having pain after ultrasound and will be given another dose of pain medication.  IV fluids were continued and will be given a dose of Protonix.  Patient was sleepy if the pain medication but still reported some pain.  At this time given the lack of findings cholecystitis patient could be discharged home however wish to get her better pain control or possibly have her evaluated by surgery if pain is not better control.  Patient will be given further fluids to help dilate the gallbladder and pain medication if needed.  She will be sent out on coming physician for ultimate disposition after reevaluation.    I have reviewed the nursing notes.    I have reviewed the findings, diagnosis, plan and need for follow up with the patient.    New Prescriptions    HYDROCODONE-ACETAMINOPHEN (NORCO) 5-325 MG PER TABLET    Take 1-2 tablets by mouth every 4 hours as needed for moderate to severe pain    ONDANSETRON (ZOFRAN ODT) 4 MG ODT TAB    Take 1 tablet (4 mg) by mouth every 8 hours as needed for nausea       Final diagnoses:   Cholelithiasis without cholecystitis       3/20/2017   [facility], [place], EMERGENCY DEPARTMENT     Domenico Blank MD  03/20/17 7161

## 2017-03-20 NOTE — IP AVS SNAPSHOT
Unit 6D Observation [address] [address]    Phone:  [phone]    Fax:  [phone]                                       After Visit Summary   3/20/2017    Candi Saez    MRN: [id number]           After Visit Summary Signature Page     I have received my discharge instructions, and my questions have been answered. I have discussed any challenges I see with this plan with the nurse or doctor.    ..........................................................................................................................................  Patient/Patient Representative Signature      ..........................................................................................................................................  Patient Representative Print Name and Relationship to Patient    ..................................................               ................................................  Date                                            Time    ..........................................................................................................................................  Reviewed by Signature/Title    ...................................................              ..............................................  Date                                                            Time

## 2017-03-20 NOTE — ED NOTES
Sign out from Dr. Blank at 8AM    Situation:  26 yo F presenting with epigastric pain and vomiting. Not improved with outpatient treatment for gastritis/PUD. Presented with active vomiting and increased pain.   No fever. Labs notable for negative LFTs and lipase. WBC of 11.   US with gallstones without other signs of acute cholecystitis.     Treated with protonix, GI cocktail, dilaudid and zofran. IVF.    Plan: Reassess symptoms. Discharge if improved. Decision of admission vs. Transfer to the  ED for surgery consult if not improving.    Events:   8:59 AM  Pt not feeling better. Continues to have nausea and feel unwell. Abdominal tenderness is quite diffuse, in epigastrium and bilateral upper quadrants.   Paged surgery staff to discuss, but will plan for admission to the ED obs unit for ongoing symptom management, surgical consultation and possible GI consultation. Discussed with the ED obs [facility]NA.     Jeanie Morrissey MD FACEP    MD Ghanshyam Emanuel, Jeanie Messer MD  03/20/17 0901

## 2017-03-20 NOTE — DISCHARGE INSTRUCTIONS
-Please make an appointment to follow up with Your Primary Care Provider and Surgery (General) (phone: [phone]) as soon as possible to discuss further care and surgery for your gallbladder.  -Recommend monitoring for any return of or worsening pain, fevers, or nausea/vomiting that are uncontrolled and unable to keep any fluids down. If you are restarting oral intake back to normal diet recommend small amounts of food with increases to normal as tolerated.      Gallstones with Biliary Colic    You have abdominal pain due to irritation and spasm of the gallbladder. This is called biliary colic. The gallbladder is a small sac under the liver, which stores and releases a fluid that aids in the digestion of fat. A collection of crystals may form stones inside the gallbladder (gallstones). Gallstones can cause the gallbladder to spasm. If they block the duct out of the gallbladder, they can cause pain and even an infection.   A number of factors increase the risk for having gallstones:    Being female    Being severely overweight (obese)    Older age    Losing or gaining weight quickly    Eating a high-calorie diet    Being pregnant    Taking hormone therapy    Having diabetes  Home care    Rest in bed.    Drink only clear liquids until you feel better.    You may have been prescribed medicine for pain or nausea. Take these as directed.    Fat in your diet makes the gallbladder contract and may cause increased pain. Avoid foods that are high in fat (such as full-fat dairy, fried foods, and fatty meats) for at least two days.    If you are overweight, talk to your healthcare provider about losing weight.  Follow-up care  Follow up with your healthcare provider or as advise. There is a chance that you will have another episode of pain from your gallstones at some point. Removal of the gallbladder is an option to prevent this. Talk with your healthcare provider about your treatment options.  When to seek medical  advice  Call your healthcare provider if any of the following occur:    Worsening pain or pain lasting for longer than 6 hours    Pain moving to the right lower abdomen    Repeated vomiting    Swollen abdomen    Fever of 100.4 F (38 C) or higher, or as directed by your healthcare provider    Very dark urine, light colored stools, or yellow color of the skin or eyes    Chest, arm, back, neck or jaw pain    3062-4145 [employer]. [address] [place] [address]. All rights reserved. This information is not intended as a substitute for professional medical care. Always follow your healthcare professional's instructions.

## 2017-03-20 NOTE — IP AVS SNAPSHOT
MRN:[id number]                      After Visit Summary   3/20/2017    Candi Saez    MRN: [id number]           Thank you!     Thank you for choosing [facility] for your care. Our goal is always to provide you with excellent care. Hearing back from our patients is one way we can continue to improve our services. Please take a few minutes to complete the written survey that you may receive in the mail after you visit with us. Thank you!        Patient Information     Date Of Birth          1990        About your hospital stay     You were admitted on:  March 20, 2017 You last received care in the:  Unit 6D Observation [facility]    You were discharged on:  March 22, 2017        Reason for your hospital stay       Laparoscopic cholecystectomy                  Who to Call     For medical emergencies, please call 911.  For non-urgent questions about your medical care, please call your primary care provider or clinic, [phone]  For questions related to your surgery, please call your surgery clinic        Attending Provider     Provider Domenico Torres MD Emergency Medicine    Nadiya, Anatoly Mendez MD Emergency Medicine       Primary Care Provider Office Phone # Fax #    [facility] [phone]       [facility] [address]        After Care Instructions     Discharge Instructions       DIET  -Regular diet as prior to admission  -We recommend eating slowly, chewing thoroughly, eating small frequent meals throughout the day  -Stay well hydrated    ACTIVITY  -No lifting, pushing, pulling greater than 10 lbs and no strenuous exercise for 4 weeks   -No driving while on narcotic analgesics (i.e. Percocet, oxycodone, Vicodin)  -No driving until you are able to fully twist to both sides quickly and without any pain    WOUND CLINIC  -Inspect your wounds daily for signs of infection (increased redness, drainage,  pain)  -Keep your wound clean and dry  -You may shower, but do not soak in tub or pool    NOTIFY  Please contact the emergency department for problems after discharge such as:  -Temperature > 101F, chills, rigors, dizziness  -Redness around or purulent drainage from wound  -Inability to tolerate diet, nausea or vomiting  -You stop passing gas (or your stoma stops functioning), develop significant bloating, abdominal pain  -Have blood in stools/vomit  -Have severe diarrhea/constipation  -Any other questions or concerns.  - At nights (after 4:30pm), on weekends, or if urgent, call [phone] and ask the  to speak with the on-call Surgery resident or fellow      Medication Instructions  Some of your medications may have changed. Please take only prescribed and resumed medications     FOLLOW-UP  1. Follow up in the General Surgery clinic in 2 weeks.      *For other appointments on [facility] and/or [facility] (with [facility] or [facility] provider or service): Call [phone] if you have not heard regarding these appointments within 7 days of discharge.                  Follow-up Appointments     Adult [facility] Follow-up and recommended labs and tests       Follow up in the General Surgery clinic in 2 weeks.    Appointments on [facility] and/or [facility] (with [facility] or [facility] provider or service). Call [phone] if you haven't heard regarding these appointments within 7 days of discharge.                  Further instructions from your care team       -Please make an appointment to follow up with Your Primary Care Provider and Surgery (General) (phone: [phone]) as soon as possible to discuss further care and surgery for your gallbladder.  -Recommend monitoring for any return of or worsening pain, fevers, or nausea/vomiting that are uncontrolled and unable to keep any fluids down. If you are restarting oral intake back to normal diet recommend small amounts of food with increases to normal as  tolerated.      Gallstones with Biliary Colic    You have abdominal pain due to irritation and spasm of the gallbladder. This is called biliary colic. The gallbladder is a small sac under the liver, which stores and releases a fluid that aids in the digestion of fat. A collection of crystals may form stones inside the gallbladder (gallstones). Gallstones can cause the gallbladder to spasm. If they block the duct out of the gallbladder, they can cause pain and even an infection.   A number of factors increase the risk for having gallstones:    Being female    Being severely overweight (obese)    Older age    Losing or gaining weight quickly    Eating a high-calorie diet    Being pregnant    Taking hormone therapy    Having diabetes  Home care    Rest in bed.    Drink only clear liquids until you feel better.    You may have been prescribed medicine for pain or nausea. Take these as directed.    Fat in your diet makes the gallbladder contract and may cause increased pain. Avoid foods that are high in fat (such as full-fat dairy, fried foods, and fatty meats) for at least two days.    If you are overweight, talk to your healthcare provider about losing weight.  Follow-up care  Follow up with your healthcare provider or as advise. There is a chance that you will have another episode of pain from your gallstones at some point. Removal of the gallbladder is an option to prevent this. Talk with your healthcare provider about your treatment options.  When to seek medical advice  Call your healthcare provider if any of the following occur:    Worsening pain or pain lasting for longer than 6 hours    Pain moving to the right lower abdomen    Repeated vomiting    Swollen abdomen    Fever of 100.4 F (38 C) or higher, or as directed by your healthcare provider    Very dark urine, light colored stools, or yellow color of the skin or eyes    Chest, arm, back, neck or jaw pain    5405-4142 [employer]. [address] [place] [address]  "[place] [address]. All rights reserved. This information is not intended as a substitute for professional medical care. Always follow your healthcare professional's instructions.          Pending Results     Date and Time Order Name Status Description    3/21/2017 1350 Surgical pathology exam In process     3/20/2017 1254 Blood culture Preliminary     3/20/2017 1254 Blood culture Preliminary             Statement of Approval     Ordered          17 1447  I have reviewed and agree with all the recommendations and orders detailed in this document.  EFFECTIVE NOW     Approved and electronically signed by:  Damien Duncan MD             Admission Information     Date & Time Provider Department Dept. Phone    3/20/2017 Anatoly Hearn MD Unit 6D Observation [facility] [phone]      Your Vitals Were     Blood Pressure Pulse Temperature Respirations Height Weight    128/67 (BP Location: Right arm) 61 98.6  F (37  C) (Oral) 16 1.626 m (5' 4.02\") 95.3 kg (210 lb)    Pulse Oximetry BMI (Body Mass Index)                96% 36.03 kg/m2          [employer] Information     [employer] lets you send messages to your doctor, view your test results, renew your prescriptions, schedule appointments and more. To sign up, go to [email or web address] . Click on \"Log in\" on the left side of the screen, which will take you to the Welcome page. Then click on \"Sign up Now\" on the right side of the page.     You will be asked to enter the access code listed below, as well as some personal information. Please follow the directions to create your username and password.     Your access code is: [email or web address]  Expires: 2017  2:49 PM     Your access code will  in 90 days. If you need help or a new code, please call your [facility] or [phone].        Care EveryWhere ID     This is your Care EveryWhere ID. This could be used by other organizations to access your [facility] medical records  [id number]      "      Review of your medicines      START taking        Dose / Directions    HYDROcodone-acetaminophen 5-325 MG per tablet   Commonly known as:  NORCO   Used for:  Cholelithiasis without cholecystitis        Dose:  1-2 tablet   Take 1-2 tablets by mouth every 4 hours as needed for moderate to severe pain   Quantity:  15 tablet   Refills:  0       polyethylene glycol Packet   Commonly known as:  MIRALAX/GLYCOLAX        Dose:  17 g   Take 17 g by mouth daily   Quantity:  7 packet   Refills:  0         CONTINUE these medicines which may have CHANGED, or have new prescriptions. If we are uncertain of the size of tablets/capsules you have at home, strength may be listed as something that might have changed.        Dose / Directions    * ondansetron 8 MG ODT tab   Commonly known as:  ZOFRAN-ODT   This may have changed:  Another medication with the same name was added. Make sure you understand how and when to take each.        Dose:  8 mg   Take 8 mg by mouth every 8 hours as needed for nausea   Refills:  0       * ondansetron 4 MG ODT tab   Commonly known as:  ZOFRAN ODT   This may have changed:  You were already taking a medication with the same name, and this prescription was added. Make sure you understand how and when to take each.        Dose:  4 mg   Take 1 tablet (4 mg) by mouth every 8 hours as needed for nausea   Quantity:  10 tablet   Refills:  0       * Notice:  This list has 2 medication(s) that are the same as other medications prescribed for you. Read the directions carefully, and ask your doctor or other care provider to review them with you.      CONTINUE these medicines which have NOT CHANGED        Dose / Directions    DEPO-ESTRADIOL IM        Inject into the muscle every 3 months   Refills:  0       diphenhydrAMINE HCl 50 MG Tabs        Dose:  25-50 mg   Take 25-50 mg by mouth every 6 hours as needed (allergies)   Refills:  0       diphenoxylate-atropine 2.5-0.025 MG per tablet   Commonly known as:   LOMOTIL        Dose:  1-2 tablet   Take 1-2 tablets by mouth 4 times daily as needed for diarrhea   Refills:  0       ibuprofen 200 MG tablet   Commonly known as:  ADVIL/MOTRIN        Dose:  400 mg   Take 400 mg by mouth every 6 hours as needed for mild pain   Refills:  0       loratadine 10 MG tablet   Commonly known as:  CLARITIN        Dose:  10 mg   Take 10 mg by mouth daily as needed for allergies   Refills:  0       multivitamin, therapeutic with minerals Tabs tablet        Dose:  1 tablet   Take 1 tablet by mouth daily   Refills:  0       omeprazole 40 MG capsule   Commonly known as:  priLOSEC   Used for:  Gastritis        Dose:  40 mg   Take 1 capsule (40 mg) by mouth daily Take 30-60 minutes before a meal.   Quantity:  30 capsule   Refills:  1       sucralfate 1 GM tablet   Commonly known as:  CARAFATE   Used for:  Gastritis        Dose:  1 g   Take 1 tablet (1 g) by mouth 4 times daily   Quantity:  40 tablet   Refills:  1       ZANTAC PO   Indication:  Gastroesophageal Reflux Disease        Dose:  150 mg   Take 150 mg by mouth daily as needed for heartburn   Refills:  0         STOP taking     acetaminophen 500 MG tablet   Commonly known as:  TYLENOL                Where to get your medicines      Some of these will need a paper prescription and others can be bought over the counter. Ask your nurse if you have questions.     Bring a paper prescription for each of these medications     HYDROcodone-acetaminophen 5-325 MG per tablet    ondansetron 4 MG ODT tab    polyethylene glycol Packet                Protect others around you: Learn how to safely use, store and throw away your medicines at www.disposemymeds.org.             Medication List: This is a list of all your medications and when to take them. Check marks below indicate your daily home schedule. Keep this list as a reference.      Medications           Morning Afternoon Evening Bedtime As Needed    DEPO-ESTRADIOL IM   Inject into the muscle every  3 months                                diphenhydrAMINE HCl 50 MG Tabs   Take 25-50 mg by mouth every 6 hours as needed (allergies)                                diphenoxylate-atropine 2.5-0.025 MG per tablet   Commonly known as:  LOMOTIL   Take 1-2 tablets by mouth 4 times daily as needed for diarrhea                                HYDROcodone-acetaminophen 5-325 MG per tablet   Commonly known as:  NORCO   Take 1-2 tablets by mouth every 4 hours as needed for moderate to severe pain                                ibuprofen 200 MG tablet   Commonly known as:  ADVIL/MOTRIN   Take 400 mg by mouth every 6 hours as needed for mild pain                                loratadine 10 MG tablet   Commonly known as:  CLARITIN   Take 10 mg by mouth daily as needed for allergies                                multivitamin, therapeutic with minerals Tabs tablet   Take 1 tablet by mouth daily                                omeprazole 40 MG capsule   Commonly known as:  priLOSEC   Take 1 capsule (40 mg) by mouth daily Take 30-60 minutes before a meal.                                * ondansetron 8 MG ODT tab   Commonly known as:  ZOFRAN-ODT   Take 8 mg by mouth every 8 hours as needed for nausea                                * ondansetron 4 MG ODT tab   Commonly known as:  ZOFRAN ODT   Take 1 tablet (4 mg) by mouth every 8 hours as needed for nausea                                polyethylene glycol Packet   Commonly known as:  MIRALAX/GLYCOLAX   Take 17 g by mouth daily   Last time this was given:  17 g on 3/22/2017  8:07 AM                                sucralfate 1 GM tablet   Commonly known as:  CARAFATE   Take 1 tablet (1 g) by mouth 4 times daily                                ZANTAC PO   Take 150 mg by mouth daily as needed for heartburn                                * Notice:  This list has 2 medication(s) that are the same as other medications prescribed for you. Read the directions carefully, and ask your doctor or  other care provider to review them with you.

## 2017-03-20 NOTE — H&P
"Per ED Physician note \"Candi Saez is a 27 year old female who presents to the ER for central chest to epigastric pain. Patient reports that pain had started in the evening possibility after dinner however was very mild. She had gone to bed but could not sleep due to the pain and woke or got up suddenly at about 2 AM with severe pain in that area. She also felt nauseous but had no immediate. Patient vomited while in the ER. She denies any fevers chills, no urinary symptoms. She reports that she's had pain there before which has been diagnosed as gastritis last year and she's been taking omeprazole. She did try taking omeprazole at home however this made the nausea worse. She reports no dark or bloody stools. The pain is not burning but more of a sharp cyclic pain in her upper abdomen.\"  Plan was for discharge from the ED, but no improvement with outpatient treatment for gastritis/PUD.   In the ED: Vital signs: /90; Temperature 97.5; Respiratory rate 20; Oxygen Saturation on room air 97%.  Noted periods of increased respiratory rate and decreased oxygen saturation in ED-> likely from hyperventilation due to pain/nausea.  Lab Work: White Blood Cell Count 11.4; Alkaline Phosphatase 106; ALT 27; AST 16; Lipase 177; Glucose 106; Sodium 142; Potassium 3.8; BUN 9; Creatinine 0.67; GFR >90; Hgb 14.3; Platelets 376; INR 1.00  EKG shows normal sinus rhythm without ST-T changes. Ultrasound abdomen limited results: The liver is normal in size and texture without focal mass. There is no intra or extrahepatic biliary dilatation. The common hepatic duct measures 0.4 cm. There are stones in the gallbladder. The gallbladder otherwise appears normal. The pancreas head and body appear normal. The tail is obscured by bowel gas. The right kidney measures 10.6 cm and is normal in appearance.    IMPRESSION: Cholelithiasis. There is no biliary dilatation or evidence of cholecystitis.    Medication: 1 liter NS IV bolus, with " "maintenance at 125 cc/hour; Hydromorphone 2 mg IV and Zofran 12 mg IV; Protonix 40 mg IV x 1.    ED Observation: Sudden onset of severe heartburn, RUQ/Mid-Sternal chest pain, nausea and vomiting between 1 and 2 am this morning.  Hx: gastritis and alcohol abuse-last etoh drink 3 days ago- had 3 beers. Drinks alcohol once a month. Admits to tobacco use.  Denies street drug use, recent abx use, travel, restaurant food or sick contacts.   Candi states feels constipated- last bowel movement yesterday- usually has bowel movement every day. Had pizza for supper last night. 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  Seen 2016 at [facility] for gastritis/possible ulcer. Discharged on Carafate & Prilosec- stopped taking medication this past fall. States takes Zantac \" only when I need it. \"  Patient denies cough, diarrhea, fever, chills, back pain, dysuria or frequency in urination.   Sexually active, male partner. Last LMP last month: On Depo Provera.  1, Para 1 (child 3 years old). Denies vaginal bleeding, discharge or lower abdominal cramping. Hx: Chlamydia 1 year ago.  When I walked I patient's room, she was sleeping, upon waking up-states \" severe abdominal pain\" holding RUQ abdomen- given Hydromorphone 0.2 mg IV x 1 during my assessment with some relief.    Admitted to the observation unit in stable condition for pain control, IV fluids, serial abdominal exams, surgical and GI consult. Will obtain surgical consult first given significant amount of abdominal pain.    PCP: [facility].    Past Medical History   Diagnosis Date     Anemia      Asthma        Allergies: No Known Allergies    Active Problems:    Abdominal pain    Blood pressure 118/73, pulse 78, temperature 98  F (36.7  C), temperature source Oral, resp. rate 18, weight 95.3 kg (210 lb), SpO2 99 %, not currently breastfeeding.    Review of Systems "   Constitutional: Negative for chills, diaphoresis, fever, malaise/fatigue and weight loss.   HENT: Negative for ear discharge, ear pain, nosebleeds and tinnitus.    Eyes: Negative for double vision and pain.   Respiratory: Negative for cough and hemoptysis.    Cardiovascular: Positive for chest pain. Negative for palpitations and leg swelling.        Low mid-chest pain   Gastrointestinal: Positive for abdominal pain, constipation, heartburn, nausea and vomiting. Negative for blood in stool, diarrhea and melena.        Epigastric/RUQ/LUQ   Genitourinary: Negative for dysuria, flank pain, frequency, hematuria and urgency.   Musculoskeletal: Negative for myalgias.   Skin: Negative for itching and rash.   Neurological: Negative for dizziness, tremors, sensory change, speech change, focal weakness, seizures, loss of consciousness, weakness and headaches.   Endo/Heme/Allergies: Negative for polydipsia. Does not bruise/bleed easily.   Psychiatric/Behavioral: Negative for substance abuse.       Physical Exam   Constitutional: She is oriented to person, place, and time. Vital signs are normal. She appears well-developed and well-nourished. She is cooperative. She is easily aroused. She appears distressed.   Drowsy   HENT:   Head: Normocephalic and atraumatic.   Mouth/Throat: Oropharynx is clear and moist. No oropharyngeal exudate.   Eyes: Pupils are equal, round, and reactive to light.   Neck: Normal range of motion. Neck supple. No JVD present. No tracheal deviation present. No thyromegaly present.   Cardiovascular: Normal rate, regular rhythm, S1 normal and S2 normal.    No murmur heard.  Pulmonary/Chest: Effort normal. No stridor. No respiratory distress. She has no wheezes. She has no rales. She exhibits no tenderness.   Lung sounds clear to all lung fields   Abdominal: Soft. She exhibits no distension. There is tenderness. There is rebound. There is no guarding.   Hypoactive bowel sounds RUQ, RLQ, LLQ  Active bowel  sounds LUQ  RUQ, LUQ, Epigastric tenderness with light and deep palpation  Positive Arroyo's sign  Negative Psoas' sign  Could not tolerate further abdomen testing   Musculoskeletal: Normal range of motion. She exhibits no edema, tenderness or deformity.   No CVA tenderness   Lymphadenopathy:     She has no cervical adenopathy.   Neurological: She is alert, oriented to person, place, and time and easily aroused.   Drowsy, follows commands appropriately   Skin: Skin is warm and dry. No rash noted. She is not diaphoretic. No erythema. No pallor.   Psychiatric: Judgment and thought content normal.   Flat affect   Nursing note and vitals reviewed.      Assessment and Plan: 27 year old female with past medical history of gastritis and childhood asthma presenting with Epigastric/Mid-Sternal Chest/RUQ/LUQ pain, nausea and vomiting since 02:00 this morning.  Differentials include but not limited to: PUD, Gastritis, H. Pylori, Gastroenteritis, Gallstone obstruction, Pancreatitis, Kidney stone, C.Difficile, Mononucleosis.     ## 1. RUQ Abdominal Pain: Significant Epigastric, RUQ, LUQ tenderness. Ultrasound showed cholelithiasis without biliary dilatation or cholecystitis. Afebrile. WBC 11.4. Lipase and LFTs within normal limits. Troponin negative. EKG sinus rhythm without ST-T changes. No CVA tenderness. I spoke to GI who recommended HIDA scan given gallstones/intractable pain.    - Raymond to observation  - Surgery consult, ASAP, appreciate recommendations  - GI consult, ASAP, once cleared by surgery, appreciate recommendations  - HIDA Scan today  - NS IV @ 125 cc an hour  - Strict NPO  - Protonix 40 mg IV BID  - Hydromorphone 0.5 mg IV every 3 hours prn  - Vital signs every 2 hours  - Continuous oxygen oximetry  - GCS every 2 hours  - Acetaminophen 1 gram IV x 1 ( holding narcotics per nuc medicine for pending hiatal scan).  - Serial abdominal examinations  - Repeat CBC, CMP, Lipase now  - Urinalysis  - Mononucleosis  (add-on)  - C-Difficile, JC, O & P, Giardia stool (No diarrhea, per GI request).  - Blood alcohol  - Urine drug screen  - HCG Quant pregnancy  - Lactic acid  - Blood Cultures x 2  - Type and Screen  - PT/INR  - Consider further imaging Flat and Upright X-ray vs CT, given intractable pain      03/20/2017 @ 13:30: Discussed case with Dr. Hearn and surgery resident Dr. Najera, including repeat elevated WBC 18.1/Absolute Neutrophils 16.6. Lactic acid 1.6. Dr. Najera not convinced source of pain is Candi's gallbladder-> obtain 1 view abdomen x-ray rule out free air ->(CT not necessary at this time) and GI consult for possible upper endoscopy for peptic ulcer disease work-up.  Per Dr. Najera:   - Trend CBC & LFTs  -- Recommend gastroenterology consult for EGD to evaluate for peptic ulcer disease  -- Will follow results of HIDA scan; would still recommend GI evaluation given false positive rate of HIDA  -- Keep NPO for possible procedures tomorrow  -- Continue PRN pain control, IVF, antiemetics PRN  -- Surgery will follow  ( See Dr. Najera's consult note for further details).    03/20/2017 @ 13:37: HIDA scan scheduled for 4 pm. Keep NPO. Hold all morphine derivatives per nuc med.  Relayed above information to patient's primary RN.  Lab work @ 13:15: CRP 3.1; BUN 6; Creatinine 0.56; Hgb 13.3; Glucose 122, Hgb 13.3; HCG Quant negative; Mononucleosis negative  - Trend CBC, LFTs, lipase & CRP every 6 hours (next draw at 19:30 tonight- already ordered).    03/20/2017 @ 15:20: Per GI-> XR abdomen 1 view shows small stool burden right side of abdomen; No free air and nonobstructive bowel gas pattern.   - Miralax po daily; Pink lady enema-> Clear liquid diet, no reds->Tramadol po->GI to see in am    03/20/2017 @ 18:30: Radiologist called with HIDA scan results: Gallbladder never fills, thus positive scan-> cystic duct obstruction  -- Keep patient NPO; Will page surgery. Discontinue Miralax and Pink Lady.    03/20/2017 @ 18:45:  Surgery called, spoke to Dr. Perla-> Given HIDA scan results. Primary surgical team to see patient in the morning. NPO after midnight. Continue with plan per Dr. Najera. Will page medicine triage for inpatient admit.    03/20/2017 @ 19:05: Spoke to medicine triage-declined patient-> more appropriate for surgery team.    03/20/2017 @ 19:15: Per bret Gandhi attending general surgery, Dr. Castillo, who accepted patient.    Discussed case with Dr. Hearn.    PIV: Peripheral IV Access  FEN:  Clear liquids now, NPO after midnight  Code Status: Full Code  GI Prophylaxis: Protonix IV  DVT Prophylaxis: Early ambulation, anticipate short stay  Disposition: Abdominal pain resolved, stable vital signs & lab work, can tolerate po, GI and surgery consult cleared    Discussed case with Dr. Hearn.    CHAUNCEY Kc, CNP  ED Observation Unit  [facility]  3/20/2017

## 2017-03-20 NOTE — ED NOTES
Return from ultrasound crying and c/o increased chest/ upper left abdominal pain.  Medicated with dilaudid with decrease in pain

## 2017-03-20 NOTE — ED NOTES
Chest and upper abdominal pain that started at 0200. Took Omeprazole with no effect. Constant pain.

## 2017-03-21 ENCOUNTER — ANESTHESIA EVENT (OUTPATIENT)
Dept: SURGERY | Facility: CLINIC | Age: 27
End: 2017-03-21
Payer: MEDICAID

## 2017-03-21 ENCOUNTER — ANESTHESIA (OUTPATIENT)
Dept: SURGERY | Facility: CLINIC | Age: 27
End: 2017-03-21
Payer: MEDICAID

## 2017-03-21 LAB
AMPHETAMINES UR QL SCN: ABNORMAL
BARBITURATES UR QL: ABNORMAL
BENZODIAZ UR QL: ABNORMAL
CANNABINOIDS UR QL SCN: ABNORMAL
COCAINE UR QL: ABNORMAL
ETHANOL UR QL SCN: ABNORMAL
OPIATES UR QL SCN: ABNORMAL
PCP UR QL SCN: ABNORMAL

## 2017-03-21 PROCEDURE — 96361 HYDRATE IV INFUSION ADD-ON: CPT

## 2017-03-21 PROCEDURE — 88304 TISSUE EXAM BY PATHOLOGIST: CPT | Performed by: SURGERY

## 2017-03-21 PROCEDURE — 25000125 ZZHC RX 250: Performed by: NURSE ANESTHETIST, CERTIFIED REGISTERED

## 2017-03-21 PROCEDURE — 25000128 H RX IP 250 OP 636: Performed by: NURSE PRACTITIONER

## 2017-03-21 PROCEDURE — G0378 HOSPITAL OBSERVATION PER HR: HCPCS

## 2017-03-21 PROCEDURE — 25000128 H RX IP 250 OP 636: Performed by: NURSE ANESTHETIST, CERTIFIED REGISTERED

## 2017-03-21 PROCEDURE — 71000015 ZZH RECOVERY PHASE 1 LEVEL 2 EA ADDTL HR: Performed by: SURGERY

## 2017-03-21 PROCEDURE — 37000009 ZZH ANESTHESIA TECHNICAL FEE, EACH ADDTL 15 MIN: Performed by: SURGERY

## 2017-03-21 PROCEDURE — 25000125 ZZHC RX 250

## 2017-03-21 PROCEDURE — C9290 INJ, BUPIVACAINE LIPOSOME: HCPCS

## 2017-03-21 PROCEDURE — 36000057 ZZH SURGERY LEVEL 3 1ST 30 MIN - UMMC: Performed by: SURGERY

## 2017-03-21 PROCEDURE — 25000128 H RX IP 250 OP 636

## 2017-03-21 PROCEDURE — 25000125 ZZHC RX 250: Performed by: NURSE PRACTITIONER

## 2017-03-21 PROCEDURE — 25000132 ZZH RX MED GY IP 250 OP 250 PS 637: Performed by: NURSE PRACTITIONER

## 2017-03-21 PROCEDURE — 71000014 ZZH RECOVERY PHASE 1 LEVEL 2 FIRST HR: Performed by: SURGERY

## 2017-03-21 PROCEDURE — 25000566 ZZH SEVOFLURANE, EA 15 MIN: Performed by: SURGERY

## 2017-03-21 PROCEDURE — 27210794 ZZH OR GENERAL SUPPLY STERILE: Performed by: SURGERY

## 2017-03-21 PROCEDURE — 96376 TX/PRO/DX INJ SAME DRUG ADON: CPT

## 2017-03-21 PROCEDURE — 40000171 ZZH STATISTIC PRE-PROCEDURE ASSESSMENT III: Performed by: SURGERY

## 2017-03-21 PROCEDURE — 25800025 ZZH RX 258: Performed by: NURSE ANESTHETIST, CERTIFIED REGISTERED

## 2017-03-21 PROCEDURE — 25000125 ZZHC RX 250: Performed by: ANESTHESIOLOGY

## 2017-03-21 PROCEDURE — 25000128 H RX IP 250 OP 636: Performed by: ANESTHESIOLOGY

## 2017-03-21 PROCEDURE — 37000008 ZZH ANESTHESIA TECHNICAL FEE, 1ST 30 MIN: Performed by: SURGERY

## 2017-03-21 PROCEDURE — 25000128 H RX IP 250 OP 636: Performed by: SURGERY

## 2017-03-21 PROCEDURE — 36000059 ZZH SURGERY LEVEL 3 EA 15 ADDTL MIN UMMC: Performed by: SURGERY

## 2017-03-21 PROCEDURE — 25000132 ZZH RX MED GY IP 250 OP 250 PS 637: Performed by: STUDENT IN AN ORGANIZED HEALTH CARE EDUCATION/TRAINING PROGRAM

## 2017-03-21 RX ORDER — NALOXONE HYDROCHLORIDE 0.4 MG/ML
.1-.4 INJECTION, SOLUTION INTRAMUSCULAR; INTRAVENOUS; SUBCUTANEOUS
Status: DISCONTINUED | OUTPATIENT
Start: 2017-03-21 | End: 2017-03-21 | Stop reason: HOSPADM

## 2017-03-21 RX ORDER — SODIUM CHLORIDE, SODIUM LACTATE, POTASSIUM CHLORIDE, CALCIUM CHLORIDE 600; 310; 30; 20 MG/100ML; MG/100ML; MG/100ML; MG/100ML
INJECTION, SOLUTION INTRAVENOUS CONTINUOUS PRN
Status: DISCONTINUED | OUTPATIENT
Start: 2017-03-21 | End: 2017-03-21

## 2017-03-21 RX ORDER — NEOSTIGMINE METHYLSULFATE 1 MG/ML
VIAL (ML) INJECTION PRN
Status: DISCONTINUED | OUTPATIENT
Start: 2017-03-21 | End: 2017-03-21

## 2017-03-21 RX ORDER — HYDROMORPHONE HCL/0.9% NACL/PF 0.2MG/0.2
.1-.2 SYRINGE (ML) INTRAVENOUS
Status: DISCONTINUED | OUTPATIENT
Start: 2017-03-21 | End: 2017-03-22

## 2017-03-21 RX ORDER — FENTANYL CITRATE 50 UG/ML
25-50 INJECTION, SOLUTION INTRAMUSCULAR; INTRAVENOUS
Status: DISCONTINUED | OUTPATIENT
Start: 2017-03-21 | End: 2017-03-21 | Stop reason: HOSPADM

## 2017-03-21 RX ORDER — BUPIVACAINE HYDROCHLORIDE AND EPINEPHRINE 2.5; 5 MG/ML; UG/ML
INJECTION, SOLUTION INFILTRATION; PERINEURAL PRN
Status: DISCONTINUED | OUTPATIENT
Start: 2017-03-21 | End: 2017-03-21

## 2017-03-21 RX ORDER — SODIUM CHLORIDE, SODIUM LACTATE, POTASSIUM CHLORIDE, CALCIUM CHLORIDE 600; 310; 30; 20 MG/100ML; MG/100ML; MG/100ML; MG/100ML
INJECTION, SOLUTION INTRAVENOUS CONTINUOUS
Status: DISCONTINUED | OUTPATIENT
Start: 2017-03-21 | End: 2017-03-21 | Stop reason: HOSPADM

## 2017-03-21 RX ORDER — LIDOCAINE HYDROCHLORIDE 20 MG/ML
INJECTION, SOLUTION INFILTRATION; PERINEURAL PRN
Status: DISCONTINUED | OUTPATIENT
Start: 2017-03-21 | End: 2017-03-21

## 2017-03-21 RX ORDER — ONDANSETRON 2 MG/ML
4 INJECTION INTRAMUSCULAR; INTRAVENOUS EVERY 30 MIN PRN
Status: DISCONTINUED | OUTPATIENT
Start: 2017-03-21 | End: 2017-03-21 | Stop reason: HOSPADM

## 2017-03-21 RX ORDER — PROPOFOL 10 MG/ML
INJECTION, EMULSION INTRAVENOUS PRN
Status: DISCONTINUED | OUTPATIENT
Start: 2017-03-21 | End: 2017-03-21

## 2017-03-21 RX ORDER — FLUMAZENIL 0.1 MG/ML
0.2 INJECTION, SOLUTION INTRAVENOUS
Status: DISCONTINUED | OUTPATIENT
Start: 2017-03-21 | End: 2017-03-21 | Stop reason: HOSPADM

## 2017-03-21 RX ORDER — GLYCOPYRROLATE 0.2 MG/ML
INJECTION, SOLUTION INTRAMUSCULAR; INTRAVENOUS PRN
Status: DISCONTINUED | OUTPATIENT
Start: 2017-03-21 | End: 2017-03-21

## 2017-03-21 RX ORDER — OXYCODONE HYDROCHLORIDE 5 MG/1
5 TABLET ORAL ONCE
Status: COMPLETED | OUTPATIENT
Start: 2017-03-21 | End: 2017-03-21

## 2017-03-21 RX ORDER — ONDANSETRON 4 MG/1
4 TABLET, ORALLY DISINTEGRATING ORAL EVERY 30 MIN PRN
Status: DISCONTINUED | OUTPATIENT
Start: 2017-03-21 | End: 2017-03-21 | Stop reason: HOSPADM

## 2017-03-21 RX ORDER — HYDROMORPHONE HYDROCHLORIDE 1 MG/ML
.3-.5 INJECTION, SOLUTION INTRAMUSCULAR; INTRAVENOUS; SUBCUTANEOUS EVERY 5 MIN PRN
Status: DISCONTINUED | OUTPATIENT
Start: 2017-03-21 | End: 2017-03-21 | Stop reason: HOSPADM

## 2017-03-21 RX ADMIN — FENTANYL CITRATE 50 MCG: 50 INJECTION, SOLUTION INTRAMUSCULAR; INTRAVENOUS at 14:36

## 2017-03-21 RX ADMIN — PROPOFOL 30 MG: 10 INJECTION, EMULSION INTRAVENOUS at 13:37

## 2017-03-21 RX ADMIN — HYDROMORPHONE HYDROCHLORIDE 0.5 MG: 10 INJECTION, SOLUTION INTRAMUSCULAR; INTRAVENOUS; SUBCUTANEOUS at 09:14

## 2017-03-21 RX ADMIN — LIDOCAINE HYDROCHLORIDE 60 MG: 20 INJECTION, SOLUTION INFILTRATION; PERINEURAL at 13:58

## 2017-03-21 RX ADMIN — LIDOCAINE HYDROCHLORIDE 80 MG: 20 INJECTION, SOLUTION INFILTRATION; PERINEURAL at 11:44

## 2017-03-21 RX ADMIN — SODIUM CHLORIDE: 9 INJECTION, SOLUTION INTRAVENOUS at 14:37

## 2017-03-21 RX ADMIN — PANTOPRAZOLE SODIUM 40 MG: 40 INJECTION, POWDER, FOR SOLUTION INTRAVENOUS at 20:31

## 2017-03-21 RX ADMIN — SODIUM CHLORIDE: 9 INJECTION, SOLUTION INTRAVENOUS at 00:36

## 2017-03-21 RX ADMIN — SODIUM CHLORIDE, POTASSIUM CHLORIDE, SODIUM LACTATE AND CALCIUM CHLORIDE: 600; 310; 30; 20 INJECTION, SOLUTION INTRAVENOUS at 10:16

## 2017-03-21 RX ADMIN — HYDROMORPHONE HYDROCHLORIDE 0.2 MG: 10 INJECTION, SOLUTION INTRAMUSCULAR; INTRAVENOUS; SUBCUTANEOUS at 19:14

## 2017-03-21 RX ADMIN — BUPIVACAINE HYDROCHLORIDE AND EPINEPHRINE BITARTRATE 20 ML: 2.5; .005 INJECTION, SOLUTION INFILTRATION; PERINEURAL at 11:03

## 2017-03-21 RX ADMIN — PHENYLEPHRINE HYDROCHLORIDE 100 MCG: 10 INJECTION, SOLUTION INTRAMUSCULAR; INTRAVENOUS; SUBCUTANEOUS at 11:59

## 2017-03-21 RX ADMIN — SODIUM CHLORIDE, POTASSIUM CHLORIDE, SODIUM LACTATE AND CALCIUM CHLORIDE: 600; 310; 30; 20 INJECTION, SOLUTION INTRAVENOUS at 12:20

## 2017-03-21 RX ADMIN — HYDROMORPHONE HYDROCHLORIDE 0.3 MG: 10 INJECTION, SOLUTION INTRAMUSCULAR; INTRAVENOUS; SUBCUTANEOUS at 13:50

## 2017-03-21 RX ADMIN — HYDROMORPHONE HYDROCHLORIDE 0.5 MG: 10 INJECTION, SOLUTION INTRAMUSCULAR; INTRAVENOUS; SUBCUTANEOUS at 04:37

## 2017-03-21 RX ADMIN — HYDROMORPHONE HYDROCHLORIDE 0.2 MG: 10 INJECTION, SOLUTION INTRAMUSCULAR; INTRAVENOUS; SUBCUTANEOUS at 23:04

## 2017-03-21 RX ADMIN — GLYCOPYRROLATE 0.1 MG: 0.2 INJECTION, SOLUTION INTRAMUSCULAR; INTRAVENOUS at 12:15

## 2017-03-21 RX ADMIN — TRAMADOL HYDROCHLORIDE 100 MG: 50 TABLET, COATED ORAL at 21:58

## 2017-03-21 RX ADMIN — PANTOPRAZOLE SODIUM 40 MG: 40 INJECTION, POWDER, FOR SOLUTION INTRAVENOUS at 08:58

## 2017-03-21 RX ADMIN — TRAMADOL HYDROCHLORIDE 50 MG: 50 TABLET, COATED ORAL at 16:14

## 2017-03-21 RX ADMIN — PIPERACILLIN AND TAZOBACTAM 3.38 G: 3; .375 INJECTION, POWDER, LYOPHILIZED, FOR SOLUTION INTRAVENOUS; PARENTERAL at 08:58

## 2017-03-21 RX ADMIN — SODIUM CHLORIDE: 9 INJECTION, SOLUTION INTRAVENOUS at 09:17

## 2017-03-21 RX ADMIN — PIPERACILLIN AND TAZOBACTAM 3.38 G: 3; .375 INJECTION, POWDER, LYOPHILIZED, FOR SOLUTION INTRAVENOUS; PARENTERAL at 00:45

## 2017-03-21 RX ADMIN — Medication 5 MG: at 13:51

## 2017-03-21 RX ADMIN — GLYCOPYRROLATE 1 MG: 0.2 INJECTION, SOLUTION INTRAMUSCULAR; INTRAVENOUS at 13:51

## 2017-03-21 RX ADMIN — FENTANYL CITRATE 50 MCG: 50 INJECTION, SOLUTION INTRAMUSCULAR; INTRAVENOUS at 14:27

## 2017-03-21 RX ADMIN — PROPOFOL 100 MG: 10 INJECTION, EMULSION INTRAVENOUS at 11:44

## 2017-03-21 RX ADMIN — PIPERACILLIN AND TAZOBACTAM 3.38 G: 3; .375 INJECTION, POWDER, LYOPHILIZED, FOR SOLUTION INTRAVENOUS; PARENTERAL at 15:00

## 2017-03-21 RX ADMIN — MIDAZOLAM 1 MG: 1 INJECTION INTRAMUSCULAR; INTRAVENOUS at 10:54

## 2017-03-21 RX ADMIN — SODIUM CHLORIDE: 9 INJECTION, SOLUTION INTRAVENOUS at 23:25

## 2017-03-21 RX ADMIN — PROCHLORPERAZINE EDISYLATE 5 MG: 5 INJECTION INTRAMUSCULAR; INTRAVENOUS at 14:42

## 2017-03-21 RX ADMIN — BUPIVACAINE 20 ML: 13.3 INJECTION, SUSPENSION, LIPOSOMAL INFILTRATION at 11:03

## 2017-03-21 RX ADMIN — MIDAZOLAM 2 MG: 1 INJECTION INTRAMUSCULAR; INTRAVENOUS at 11:35

## 2017-03-21 RX ADMIN — HYDROMORPHONE HYDROCHLORIDE 0.5 MG: 10 INJECTION, SOLUTION INTRAMUSCULAR; INTRAVENOUS; SUBCUTANEOUS at 00:43

## 2017-03-21 RX ADMIN — ROCURONIUM BROMIDE 50 MG: 10 INJECTION INTRAVENOUS at 11:44

## 2017-03-21 RX ADMIN — HYDROMORPHONE HYDROCHLORIDE 0.2 MG: 10 INJECTION, SOLUTION INTRAMUSCULAR; INTRAVENOUS; SUBCUTANEOUS at 13:53

## 2017-03-21 RX ADMIN — PHENYLEPHRINE HYDROCHLORIDE 100 MCG: 10 INJECTION, SOLUTION INTRAMUSCULAR; INTRAVENOUS; SUBCUTANEOUS at 12:07

## 2017-03-21 RX ADMIN — ONDANSETRON 4 MG: 2 INJECTION INTRAMUSCULAR; INTRAVENOUS at 13:42

## 2017-03-21 RX ADMIN — OXYCODONE HYDROCHLORIDE 5 MG: 5 TABLET ORAL at 23:33

## 2017-03-21 RX ADMIN — PROPOFOL 30 MG: 10 INJECTION, EMULSION INTRAVENOUS at 12:41

## 2017-03-21 RX ADMIN — FENTANYL CITRATE 50 MCG: 50 INJECTION, SOLUTION INTRAMUSCULAR; INTRAVENOUS at 10:54

## 2017-03-21 RX ADMIN — FENTANYL CITRATE 250 MCG: 50 INJECTION, SOLUTION INTRAMUSCULAR; INTRAVENOUS at 11:44

## 2017-03-21 ASSESSMENT — PAIN DESCRIPTION - DESCRIPTORS
DESCRIPTORS: SORE
DESCRIPTORS: ACHING
DESCRIPTORS: SORE

## 2017-03-21 NOTE — OR NURSING
Pt received bilat TAP block in PreOp. Tolerated procedure fair. Has abdominal pain and was uncomfortable with ultrasound probe movement. Received 1mg Versed and 50mcg Fentanyl for block. VSS.   Pt's fiance, Ozzy,and Aunt in to see pt after Block per pt request.

## 2017-03-21 NOTE — ANESTHESIA PROCEDURE NOTES
Peripheral Nerve Block Procedure Note    Staff:     Anesthesiologist:  KARINA DIMAS    Resident/CRNA:  ELIZABETH RYAN    Block performed by resident/CRNA in the presence of a teaching physician    Location: Pre-op  Procedure Start/Stop TImes:      3/21/2017 10:55 AM     3/21/2017 11:03 AM    patient identified, IV checked, site marked, risks and benefits discussed, informed consent, monitors and equipment checked, pre-op evaluation, at physician/surgeon's request and post-op pain management      Correct Patient: Yes      Correct Position: Yes      Correct Site: Yes      Correct Procedure: Yes      Correct Laterality:  N/A    Site Marked:  N/A  Procedure details:     Procedure:  TAP    ASA:  2    Laterality:  Bilateral    Position:  Supine    Sterile Prep: chloraprep      Needle type: pajunk.    Needle gauge:  21    Needle length (mm):  110    Ultrasound: Yes      Ultrasound used to identify targeted nerve, plexus, or vascular structure and placed a needle adjacent to it      Permanent Image entered into patiient's record      Abnormal pain on injection: No      Blood Aspirated: No      Paresthesias:  No    Bleeding at site: No      Bolus via:  Needle    Infusion Method:  Single Shot    Complications:  None  Assessment/Narrative:     Injection made incrementally with aspirations every (mL):  5     10mL 0.25% bupivacaine with 1:200k epi and 10mL Exparel diluted in 10mL NS injected into each site

## 2017-03-21 NOTE — PLAN OF CARE
"Problem: Goal Outcome Summary  Goal: Goal Outcome Summary  Outcome: Improving  Observation charting     /61  Pulse 78  Temp 98.7  F (37.1  C) (Oral)  Resp 18  Ht 1.626 m (5' 4.02\")  Wt 95.3 kg (210 lb)  SpO2 98%  Breastfeeding? No  BMI 36.03 kg/m2     Goals:  -vital signs normal or at patient baseline: yes  -tolerating oral intake to maintain hydration: No, NPO  -adequate pain control on oral analgesics: no, IV dilaudid for pain relief  -tolerating oral antibiotics or has plans for home infusion setup: No, NPO and using IV antibiotics  -infection is improving: NA  -returns to baseline functional status: yes, up independent in room with adequate pain control using IV dilaudid  -safe disposition plan has been identified : yes, plans to discharge home      "

## 2017-03-21 NOTE — ANESTHESIA POSTPROCEDURE EVALUATION
Patient: Candi Saez    Procedure(s):  Laparoscopic Cholecystectomy - Wound Class: II-Clean Contaminated    Diagnosis:Acute Cholecystitis  Diagnosis Additional Information: No value filed.    Anesthesia Type:  No value filed.    Note:  Anesthesia Post Evaluation    Patient location during evaluation: PACU  Patient participation: Able to fully participate in evaluation  Level of consciousness: awake and alert  Pain management: adequate  Airway patency: patent  Cardiovascular status: acceptable and hemodynamically stable  Respiratory status: acceptable  Hydration status: acceptable  PONV: none     Anesthetic complications: None          Last vitals:  Vitals:    03/21/17 1415 03/21/17 1430 03/21/17 1445   BP:  106/77    Pulse:      Resp: 20 18 15   Temp:      SpO2:  99%          Electronically Signed By: Don Sotelo MD  March 21, 2017  3:09 PM

## 2017-03-21 NOTE — ANESTHESIA PREPROCEDURE EVALUATION
Anesthesia Plan      History & Physical Review  History and physical reviewed and following examination; no interval change.    ASA Status:  2 .    NPO Status:  > 8 hours    Plan for General with Intravenous induction. Maintenance will be Balanced.      Additional equipment: 2nd IV      Postoperative Care      Consents         No Known Allergies   Prescription Medications as of 3/21/2017             HYDROcodone-acetaminophen (NORCO) 5-325 MG per tablet Take 1-2 tablets by mouth every 4 hours as needed for moderate to severe pain    ondansetron (ZOFRAN ODT) 4 MG ODT tab Take 1 tablet (4 mg) by mouth every 8 hours as needed for nausea    RaNITidine HCl (ZANTAC PO) Take 150 mg by mouth daily as needed for heartburn    diphenoxylate-atropine (LOMOTIL) 2.5-0.025 MG per tablet Take 1-2 tablets by mouth 4 times daily as needed for diarrhea    ondansetron (ZOFRAN-ODT) 8 MG disintegrating tablet Take 8 mg by mouth every 8 hours as needed for nausea    ibuprofen (ADVIL,MOTRIN) 200 MG tablet Take 400 mg by mouth every 6 hours as needed for mild pain    loratadine (CLARITIN) 10 MG tablet Take 10 mg by mouth daily as needed for allergies    diphenhydrAMINE HCl 50 MG TABS Take 25-50 mg by mouth every 6 hours as needed (allergies)    multivitamin, therapeutic with minerals (THERA-VIT-M) TABS Take 1 tablet by mouth daily    sucralfate (CARAFATE) 1 GM tablet Take 1 tablet (1 g) by mouth 4 times daily    omeprazole (PRILOSEC) 40 MG capsule Take 1 capsule (40 mg) by mouth daily Take 30-60 minutes before a meal.    acetaminophen (TYLENOL) 500 MG tablet Take 2 tablets (1,000 mg) by mouth every 8 hours as needed for fever or pain    Estradiol Cypionate (DEPO-ESTRADIOL IM) Inject into the muscle every 3 months       Facility Administered Medications as of 3/21/2017             No Pre Procedure Antibiotic Needed 1 each continuous    fentaNYL Citrate (PF) (SUBLIMAZE) injection 25-50 mcg Inject 0.5-1 mLs (25-50 mcg)  "into the vein every 2 minutes as needed for other (acute pain. Max cumulative dose 250 mcg. )    midazolam (VERSED) injection 1-2 mg Inject 1-2 mLs (1-2 mg) into the vein every 4 minutes as needed for sedation    naloxone (NARCAN) injection 0.1-0.4 mg Inject 0.25-1 mLs (0.1-0.4 mg) into the vein every 2 minutes as needed for opioid reversal    flumazenil (ROMAZICON) injection 0.2 mg Inject 2 mLs (0.2 mg) into the vein q1 min prn for benzodiazepine reversal    bupivacaine liposome (EXPAREL) LONG ACTING injection was administered into the infiltration site to produce postsurgical analgesia. Duration of action is up to 72 hours, and other \"madeleine\" medications should not be given for 96 hours with the exception of the lidocaine 5% patch (LIDODERM) and the lidocaine 10mg in potassium infusions. This entry is for INFORMATION ONLY. continuous prn    bupivacaine liposome (EXPAREL) 1.3 % LA inj susp 20 mL 20 mLs by Infiltration route DURING SURGERY    bupivacaine 0.25 % - EPINEPHrine 1:200,000 injection as needed    rocuronium (ZEMURON) injection Inject into the vein as needed    propofol (DIPRIVAN) injection 10 mg/mL vial Inject into the vein as needed    lidocaine injection 2% (MDV) Inject into the vein as needed    lactated ringers infusion Inject into the vein continuous prn    phenylephrine (DAQUAN-SYNEPHRINE) injection 1 mg Inject 1 mg into the vein continuous prn    glycopyrrolate (ROBINUL) injection Inject into the vein as needed    lidocaine 1 % 1 mL (Auto Hold) ((Auto Hold) since 3/21/2017 10:05 AM) 1 mL by Other route every hour as needed (mild pain with VAD insertion or accessing implanted port,)    lidocaine (LMX4) kit (Auto Hold) ((Auto Hold) since 3/21/2017 10:05 AM) Apply topically every hour as needed for pain (with VAD insertion or accessing implanted port,)    sodium chloride (PF) 0.9% PF flush 3 mL (Auto Hold) ((Auto Hold) since 3/21/2017 10:05 AM) 3 mLs by Intracatheter route every hour as needed for line " "flush    sodium chloride (PF) 0.9% PF flush 3 mL (Auto Hold) ((Auto Hold) since 3/21/2017 10:05 AM) 3 mLs by Intracatheter route every 8 hours    0.9% sodium chloride BOLUS Inject 1,000 mLs into the vein once    Linked Group 1:  \"Followed by\" Linked Group Details     naloxone (NARCAN) injection 0.1-0.4 mg (Auto Hold) ((Auto Hold) since 3/21/2017 10:05 AM) Inject 0.25-1 mLs (0.1-0.4 mg) into the vein every 2 minutes as needed for opioid reversal    0.9% sodium chloride infusion Inject into the vein continuous    ondansetron (ZOFRAN-ODT) ODT tab 4 mg (Auto Hold) ((Auto Hold) since 3/21/2017 10:05 AM) Take 1 tablet (4 mg) by mouth every 6 hours as needed for nausea    Linked Group 2:  \"Or\" Linked Group Details     ondansetron (ZOFRAN) injection 4 mg (Auto Hold) ((Auto Hold) since 3/21/2017 10:05 AM) Inject 2 mLs (4 mg) into the vein every 6 hours as needed for nausea or vomiting    Linked Group 2:  \"Or\" Linked Group Details     pantoprazole (PROTONIX) 40 mg IV push injection (Auto Hold) ((Auto Hold) since 3/21/2017 10:05 AM) Inject 40 mg into the vein 2 times daily    acetaminophen (OFIRMEV) infusion 1,000 mg Inject 100 mLs (1,000 mg) into the vein once    polyethylene glycol (MIRALAX/GLYCOLAX) Packet 17 g (Auto Hold) ((Auto Hold) since 3/21/2017 10:05 AM) Take 17 g by mouth daily    pink lady enema (COMPOUNDED: docusate, magnesium citrate,mineral oil,sodium phosphate) (Auto Hold) ((Auto Hold) since 3/21/2017 10:05 AM) Place 286 mLs rectally once    traMADol (ULTRAM) tablet  mg (Auto Hold) ((Auto Hold) since 3/21/2017 10:05 AM) Take 1-2 tablets ( mg) by mouth every 6 hours as needed for moderate to severe pain    technetium Tc 99m mebrofenin (CHOLETEC) radioisotope injection 4.8-7.2 mCi Inject 4.8-7.2 millicuries (4.8-7.2 mCi) into the vein once    morphine (PF) injection 2 mg Inject 2 mg into the vein once    HYDROmorphone (PF) (DILAUDID) injection 0.5 mg (Auto Hold) ((Auto Hold) since 3/21/2017 10:05 AM) " Inject 0.5 mg into the vein every 3 hours as needed for moderate to severe pain    piperacillin-tazobactam (ZOSYN) 3.375 g vial to attach to  mL bag (Auto Hold) ((Auto Hold) since 3/21/2017 10:05 AM) Inject 3.375 g into the vein every 6 hours    HYDROmorphone (PF) (DILAUDID) injection 0.5 mg (Discontinued) Inject 0.5 mg into the vein every 15 minutes as needed for moderate to severe pain    morphine (PF) injection 1 mg (Discontinued) Inject 1 mg into the vein every 2 hours as needed for severe pain or breakthrough pain      No results found for this or any previous visit (from the past 4320 hour(s)).  PAST MEDICAL HISTORY:   Past Medical History   Diagnosis Date     Anemia      Asthma        PAST SURGICAL HISTORY: History reviewed. No pertinent past surgical history.    FAMILY HISTORY:   Family History   Problem Relation Age of Onset     DIABETES Paternal Grandmother      Hypertension Paternal Grandmother      CEREBROVASCULAR DISEASE Paternal Grandmother        SOCIAL HISTORY:   Social History   Substance Use Topics     Smoking status: Former Smoker     Years: 0.50     Smokeless tobacco: Never Used     Alcohol use Yes      Comment: occ     Lab Results   Component Value Date    WBC 15.4 (H) 03/20/2017    HGB 13.2 03/20/2017    HCT 38.9 03/20/2017     03/20/2017    ALT 21 03/20/2017    AST 6 03/20/2017     03/20/2017    BUN 5 (L) 03/20/2017    CO2 23 03/20/2017    TSH 1.27 11/18/2014    INR 1.05 03/20/2017     Prescriptions Prior to Admission   Medication Sig Dispense Refill Last Dose     RaNITidine HCl (ZANTAC PO) Take 150 mg by mouth daily as needed for heartburn   3/20/2017 at 0130     omeprazole (PRILOSEC) 40 MG capsule Take 1 capsule (40 mg) by mouth daily Take 30-60 minutes before a meal. 30 capsule 1 3/20/2017 at Unknown time     diphenoxylate-atropine (LOMOTIL) 2.5-0.025 MG per tablet Take 1-2 tablets by mouth 4 times daily as needed for diarrhea   Unknown at Unknown time     ondansetron  (ZOFRAN-ODT) 8 MG disintegrating tablet Take 8 mg by mouth every 8 hours as needed for nausea   More than a month at Unknown time     ibuprofen (ADVIL,MOTRIN) 200 MG tablet Take 400 mg by mouth every 6 hours as needed for mild pain   More than a month at Unknown time     loratadine (CLARITIN) 10 MG tablet Take 10 mg by mouth daily as needed for allergies   More than a month at Unknown time     diphenhydrAMINE HCl 50 MG TABS Take 25-50 mg by mouth every 6 hours as needed (allergies)   More than a month at Unknown time     multivitamin, therapeutic with minerals (THERA-VIT-M) TABS Take 1 tablet by mouth daily   More than a month at Unknown time     sucralfate (CARAFATE) 1 GM tablet Take 1 tablet (1 g) by mouth 4 times daily 40 tablet 1 More than a month at Unknown time     acetaminophen (TYLENOL) 500 MG tablet Take 2 tablets (1,000 mg) by mouth every 8 hours as needed for fever or pain 60 tablet 0 More than a month at Unknown time     Estradiol Cypionate (DEPO-ESTRADIOL IM) Inject into the muscle every 3 months    11/17/2015                     .

## 2017-03-21 NOTE — PROGRESS NOTES
"/67  Pulse 61  Temp 97.8  F (36.6  C) (Oral)  Resp 18  Ht 1.626 m (5' 4.02\")  Wt 95.3 kg (210 lb)  SpO2 97%  Breastfeeding? No  BMI 36.03 kg/m2    Patient reports pain controlled at this time with PO pain medications. Patient reports abdominal pain much improved after lap al. Four lap sites CDI. Patient has voided x 2 since returning from PACU.    -vital signs normal or at patient baseline: yes  -tolerating oral intake to maintain hydration: Patient is on full liquid diet. Will monitor how well tolerated.  -adequate pain control on oral analgesics: Yes, will continue to monitor.   -tolerating oral antibiotics or has plans for home infusion setup: IV antibiotics given x 1 post procedure.  -infection is improving: NA  -returns to baseline functional status: Patient is SBA at this time.  -safe disposition plan has been identified : yes, plans to discharge home after procedure      "

## 2017-03-21 NOTE — PLAN OF CARE
"Problem: Goal Outcome Summary  Goal: Goal Outcome Summary  Outcome: No Change  Observation Charting     /61  Pulse 78  Temp 98.7  F (37.1  C) (Oral)  Resp 18  Ht 1.626 m (5' 4.02\")  Wt 95.3 kg (210 lb)  SpO2 98%  Breastfeeding? No  BMI 36.03 kg/m2     Goals:  -vital signs normal or at patient baseline: yes  -tolerating oral intake to maintain hydration: No, NPO  -adequate pain control on oral analgesics : No, using IV dilaudid  -tolerating oral antibiotics or has plans for home infusion setup: No  -infection is improving: No  -returns to baseline functional status: Yes, up independent in room  -safe disposition plan has been identified: Yes, plans to discharge home      "

## 2017-03-21 NOTE — PROGRESS NOTES
S:patient admit form [facility] er for ongoing ruq pain. Patient developed pain at 2 am. Ongoing sx. eval in the ER with RUQ us showing stones without signs of acute cholecystitis. Patient admitted. IV fluids with zofran and dilaudid.  Surgical and GI consult with recommendations for HIDA scan.   Findings show cystic duct stone obstruction.  Patient still with sx. No fever WBC increasing now. BC sent also.  O:/84  Pulse 78  Temp 97.9  F (36.6  C) (Oral)  Resp 18  Wt 95.3 kg (210 lb)  SpO2 97%  Breastfeeding? No  BMI 36.05 kg/m2.  Patient uncomfortable but not confused. No fever or jaundice. STill with ruq pain.  Results for orders placed or performed during the hospital encounter of 03/20/17   US Abdomen Limited    Narrative    ULTRASOUND ABDOMEN LIMITED March 20, 2017 7:28 AM      HISTORY: Right upper quadrant pain.     COMPARISON: None.    FINDINGS: The liver is normal in size and texture without focal mass.  There is no intra or extrahepatic biliary dilatation. The common  hepatic duct measures 0.4 cm. There are stones in the gallbladder. The  gallbladder otherwise appears normal.  The pancreas head and body  appear normal. The tail is obscured by bowel gas.  The right kidney  measures 10.6 cm and is normal in appearance.       Impression    IMPRESSION: Cholelithiasis. There is no biliary dilatation or evidence  of cholecystitis.    YAMIL STREETER MD   NM HepatOBiliary Scan    Narrative    Resident prelim:  1. Abnormal hepatobiliary scan with nonvisualization of the  gallbladder consistent with cystic duct obstruction.  2. Enterogastric reflux.     XR Abdomen Port 1 View    Narrative    Exam: Supine portable view abdomen dated 3/20/2017    HISTORY: Abdominal pain    COMPARISON: 2/23/2016    FINDINGS: No free intraperitoneal air seen on this supine film. Small  amount of stool in the right colon. No significant small bowel gas. No  pneumatosis identified.      Impression    IMPRESSION: No free air or pneumatosis  identified. This is a supine  film. Nonobstructive bowel gas pattern.    BISI VALERIO MD   CBC with platelets differential   Result Value Ref Range    WBC 11.4 (H) 4.0 - 11.0 10e9/L    RBC Count 4.57 3.8 - 5.2 10e12/L    Hemoglobin 14.3 11.7 - 15.7 g/dL    Hematocrit 41.4 35.0 - 47.0 %    MCV 91 78 - 100 fl    MCH 31.3 26.5 - 33.0 pg    MCHC 34.5 31.5 - 36.5 g/dL    RDW 12.4 10.0 - 15.0 %    Platelet Count 376 150 - 450 10e9/L    Diff Method Automated Method     % Neutrophils 56.2 %    % Lymphocytes 38.3 %    % Monocytes 3.4 %    % Eosinophils 1.7 %    % Basophils 0.1 %    % Immature Granulocytes 0.3 %    Nucleated RBCs 0 0 /100    Absolute Neutrophil 6.4 1.6 - 8.3 10e9/L    Absolute Lymphocytes 4.4 0.8 - 5.3 10e9/L    Absolute Monocytes 0.4 0.0 - 1.3 10e9/L    Absolute Eosinophils 0.2 0.0 - 0.7 10e9/L    Absolute Basophils 0.0 0.0 - 0.2 10e9/L    Abs Immature Granulocytes 0.0 0 - 0.4 10e9/L    Absolute Nucleated RBC 0.0    INR   Result Value Ref Range    INR 1.00 0.86 - 1.14   Comprehensive metabolic panel   Result Value Ref Range    Sodium 142 133 - 144 mmol/L    Potassium 3.8 3.4 - 5.3 mmol/L    Chloride 109 94 - 109 mmol/L    Carbon Dioxide 23 20 - 32 mmol/L    Anion Gap 10 3 - 14 mmol/L    Glucose 106 (H) 70 - 99 mg/dL    Urea Nitrogen 9 7 - 30 mg/dL    Creatinine 0.67 0.52 - 1.04 mg/dL    GFR Estimate >90  Non  GFR Calc   >60 mL/min/1.7m2    GFR Estimate If Black >90   GFR Calc   >60 mL/min/1.7m2    Calcium 9.3 8.5 - 10.1 mg/dL    Bilirubin Total 0.2 0.2 - 1.3 mg/dL    Albumin 3.9 3.4 - 5.0 g/dL    Protein Total 7.9 6.8 - 8.8 g/dL    Alkaline Phosphatase 106 40 - 150 U/L    ALT 27 0 - 50 U/L    AST 16 0 - 45 U/L   Lipase   Result Value Ref Range    Lipase 177 73 - 393 U/L   Troponin I   Result Value Ref Range    Troponin I ES  0.000 - 0.045 ug/L     <0.015  The 99th percentile for upper reference range is 0.045 ug/L.  Troponin values in   the range of 0.045 - 0.120 ug/L may be  associated with risks of adverse   clinical events.     Mononucleosis screen   Result Value Ref Range    Mononucleosis Screen Negative NEG   CBC with platelets differential   Result Value Ref Range    WBC 18.1 (H) 4.0 - 11.0 10e9/L    RBC Count 4.30 3.8 - 5.2 10e12/L    Hemoglobin 13.3 11.7 - 15.7 g/dL    Hematocrit 39.1 35.0 - 47.0 %    MCV 91 78 - 100 fl    MCH 30.9 26.5 - 33.0 pg    MCHC 34.0 31.5 - 36.5 g/dL    RDW 12.8 10.0 - 15.0 %    Platelet Count 322 150 - 450 10e9/L    Diff Method Automated Method     % Neutrophils 91.8 %    % Lymphocytes 6.0 %    % Monocytes 1.9 %    % Eosinophils 0.0 %    % Basophils 0.1 %    % Immature Granulocytes 0.2 %    Nucleated RBCs 0 0 /100    Absolute Neutrophil 16.6 (H) 1.6 - 8.3 10e9/L    Absolute Lymphocytes 1.1 0.8 - 5.3 10e9/L    Absolute Monocytes 0.3 0.0 - 1.3 10e9/L    Absolute Eosinophils 0.0 0.0 - 0.7 10e9/L    Absolute Basophils 0.0 0.0 - 0.2 10e9/L    Abs Immature Granulocytes 0.0 0 - 0.4 10e9/L    Absolute Nucleated RBC 0.0    Partial thromboplastin time   Result Value Ref Range    PTT 27 22 - 37 sec   INR   Result Value Ref Range    INR 1.05 0.86 - 1.14   HCG quantitative pregnancy   Result Value Ref Range    HCG Quantitative Serum <1 0 - 5 IU/L   Comprehensive metabolic panel   Result Value Ref Range    Sodium 138 133 - 144 mmol/L    Potassium 3.8 3.4 - 5.3 mmol/L    Chloride 106 94 - 109 mmol/L    Carbon Dioxide 22 20 - 32 mmol/L    Anion Gap 11 3 - 14 mmol/L    Glucose 122 (H) 70 - 99 mg/dL    Urea Nitrogen 6 (L) 7 - 30 mg/dL    Creatinine 0.56 0.52 - 1.04 mg/dL    GFR Estimate >90  Non  GFR Calc   >60 mL/min/1.7m2    GFR Estimate If Black >90   GFR Calc   >60 mL/min/1.7m2    Calcium 8.2 (L) 8.5 - 10.1 mg/dL    Bilirubin Total 0.3 0.2 - 1.3 mg/dL    Albumin 3.8 3.4 - 5.0 g/dL    Protein Total 7.4 6.8 - 8.8 g/dL    Alkaline Phosphatase 97 40 - 150 U/L    ALT 21 0 - 50 U/L    AST 10 0 - 45 U/L   Alcohol ethyl   Result Value Ref  Range    Ethanol g/dL <0.01 <0.01 g/dL   Lactic acid whole blood   Result Value Ref Range    Lactic Acid 1.6 0.7 - 2.1 mmol/L   CRP inflammation   Result Value Ref Range    CRP Inflammation 3.1 0.0 - 8.0 mg/L   Lipase   Result Value Ref Range    Lipase 120 73 - 393 U/L   EKG 12 lead   Result Value Ref Range    Interpretation ECG Click View Image link to view waveform and result    Surgery General Adult IP Consult: Patient to be seen: ASAP within 4 hrs; Call back #: # [id number]; Epigastric, RUQ/LUQ abdominal pain- intractable nausea & vomiting- Choleliathiasis on ultrasound; Consultant may enter orders: Yes    Damien Tubbs MD     3/20/2017  6:24 PM  GENERAL SURGERY HISTORY AND PHYSICAL    ASSESSMENT/PLAN: Candi Saez is a 27-year-old female with   epigastric abdominal pain and a history of gastritis. Given   ibuprofen use and gastritis history, peptic ulcer disease is on   the differential. On exam, she is tender in both upper quadrants.   Her gallbladder is normal appearing on ultrasound. Given these   findings, acute cholecystitis is less likely. Biliary colic is   still possible given gallstones on ultrasound.    -- Trend CBC and LFTs  -- Recommend gastroenterology consult for EGD to evaluate for   peptic ulcer disease  -- Will follow results of HIDA scan; would still recommend GI   evaluation given false positive rate of HIDA  -- Keep NPO for possible procedures tomorrow  -- Continue PRN pain control, IVF, antiemetics PRN  -- Surgery will follow    Discussed with Dr. Castillo.    Damien Najera MD  PGY-2, General Surgery  Pager: [phone]    - - - - - - - - - -    CHIEF COMPLAINT: abdominal pain    HISTORY OF PRESENT ILLNESS: Candi Saez is a 27-year-old   female with burning epigastric pain since 2am this morning. The   pain started as acid reflux and progressed to burning epigastric   pain. The pain radiates to her back. She felt nauseated and   vomited once at home and once in  the ED. The pain is now located   in both upper quadrants and her epigastrium. She has previously   had pain like this and was diagnosed with gastritis on EGD. She   tried taking ranitidine and omeprazole at home but this did not   help. She denies bloody emesis. She reports taking 400 mg of   ibuprofen at least three times per week due to aches and pains   from her job as a . She denies diarrhea. She   reports constipation. She denies fevers, chills.     REVIEW OF SYSTEMS: A 10 point review of systems was asked and   negative except as above.     PAST MEDICAL HISTORY:   Gastritis    SURGICAL HISTORY:   History reviewed. No pertinent past surgical history.    SOCIAL HISTORY:   Social History     Social History     Marital status: Single     Spouse name: N/A     Number of children: N/A     Years of education: N/A     Social History Main Topics     Smoking status: Former Smoker     Years: 0.50     Smokeless tobacco: Never Used     Alcohol use Yes      Comment: occ     Drug use: No     Sexual activity: Yes     Partners: Male     Birth control/ protection: Injection     Other Topics Concern     None     Social History Narrative       FAMILY HISTORY:   Family History   Problem Relation Age of Onset     DIABETES Paternal Grandmother      Hypertension Paternal Grandmother      CEREBROVASCULAR DISEASE Paternal Grandmother    Reports history of DVT in her sister and grandmother.     ALLERGIES:    No Known Allergies    MEDICATIONS:    No current facility-administered medications on file prior to   encounter.   Current Outpatient Prescriptions on File Prior to Encounter:  omeprazole (PRILOSEC) 40 MG capsule Take 1 capsule (40 mg) by   mouth daily Take 30-60 minutes before a meal.   diphenoxylate-atropine (LOMOTIL) 2.5-0.025 MG per tablet Take 1-2   tablets by mouth 4 times daily as needed for diarrhea   ondansetron (ZOFRAN-ODT) 8 MG disintegrating tablet Take 8 mg by   mouth every 8 hours as needed for  nausea   ibuprofen (ADVIL,MOTRIN) 200 MG tablet Take 400 mg by mouth every   6 hours as needed for mild pain   loratadine (CLARITIN) 10 MG tablet Take 10 mg by mouth daily as   needed for allergies   diphenhydrAMINE HCl 50 MG TABS Take 25-50 mg by mouth every 6   hours as needed (allergies)   multivitamin, therapeutic with minerals (THERA-VIT-M) TABS Take 1   tablet by mouth daily   sucralfate (CARAFATE) 1 GM tablet Take 1 tablet (1 g) by mouth 4   times daily   acetaminophen (TYLENOL) 500 MG tablet Take 2 tablets (1,000 mg)   by mouth every 8 hours as needed for fever or pain   Estradiol Cypionate (DEPO-ESTRADIOL IM) Inject into the muscle   every 3 months        PHYSICAL EXAM:   /73  Pulse 78  Temp 98  F (36.7  C) (Oral)  Resp 18  Wt   95.3 kg (210 lb)  SpO2 99%  Breastfeeding? No  BMI 36.05 kg/m2  General: Somnolent female in moderate distress.  HEENT: Normocephalic, atraumatic. Patent nares. EOMI. PERRLA.  Chest wall: Symmetric thorax.   Respiratory: Non-labored breathing on room air.  Cardiovascular: Regular rate and rhythm.   Gastrointestinal: Abdomen soft, non-distended, tender to   palpation at epigastrium, LUQ, and RUQ. No rebound or guarding.   Extremities: Moving all four extremities. No limb deformities. No   pedal edema. Peripheral pulses palpable in all distal   extremities.  Skin: No rashes or lesions appreciated.    LAB DATA: Reviewed.   BMP WNL  Albumin 3.9  T bili 0.2  Alk phos 106  ALT 27  AST 16  Lipase 177  Troponin <0.015  Glucose 106  WBC 11.4  Hgb 14.3  Plt 376  INR 1.00    IMAGING:   US abdomen 3/20/2017:  FINDINGS: The liver is normal in size and texture without focal   mass.  There is no intra or extrahepatic biliary dilatation. The common  hepatic duct measures 0.4 cm. There are stones in the   gallbladder. The  gallbladder otherwise appears normal. The pancreas head and body  appear normal. The tail is obscured by bowel gas. The right   kidney  measures 10.6 cm and is normal in  appearance.   IMPRESSION: Cholelithiasis. There is no biliary dilatation or   evidence  of cholecystitis.     Troponin POCT   Result Value Ref Range    Troponin I 0.00 0.00 - 0.10 ug/L   ABO/Rh type and screen   Result Value Ref Range    ABO B     RH(D)  Pos     Antibody Screen Neg     Test Valid Only At       [facility]    Specimen Expires 03/23/2017        A:acute ruq pain with cystic duct stone, concern for cholecystitis.  P:transfer care to surgery Dr. Castillo, TREVOR zosyn with iv fluids and pain control. Surgery plan for tomorrow,.    Anatoly Hearn MD.

## 2017-03-21 NOTE — ANESTHESIA CARE TRANSFER NOTE
Patient: Candi Saez    Procedure(s):  Laparoscopic Cholecystectomy - Wound Class: II-Clean Contaminated    Diagnosis: Acute Cholecystitis  Diagnosis Additional Information: No value filed.    Anesthesia Type:   No value filed.     Note:    Patient transferred to:PACU  Comments: Anesthesia Care Transfer Note    Patient: Candi Saez    Transferred to: PACU with supplemental O2    Patient vital signs: stable    Airway: none    Monitors on, VSS, breathing spontaneously, report to JOSE ELIAS Wheeler CRNA   3/21/2017 2:10 PM      Vitals: (Last set prior to Anesthesia Care Transfer)    CRNA VITALS  3/21/2017 1335 - 3/21/2017 1410      3/21/2017             Pulse: 103    SpO2: 100 %    Resp Rate (set): 10                Electronically Signed By: CHAUNCEY Biswas CRNA  March 21, 2017  2:10 PM

## 2017-03-21 NOTE — PROGRESS NOTES
"        GASTROENTEROLOGY PROGRESS NOTE    ASSESSMENT:  Candi Saez is a 27 year old female with a history of asthma, chronic anemia, and now presenting with epigastric pain radiating to RUQ, n/v and leukocytosis. Abdominal US showed cholelithiasis w/o cholecystitis or biliary dilation. Lipase and LFTs are normal. Abdominal x-ray did not show acute abdomen. HIDA scan completed 3/20/17 showed cystic duct obstruction, our colleagues from general surgery is planning to proceed with surgery for patient's acute cholecystitis.       Patient care plan discussed with Dr. Rodriguez, GI staff physician. Thank you for involving us in this patient's care, GI team is signing off patient to primary team.  Please do not hesitate to contact the GI service with any questions or concerns.     Cata Herrera  Gastroenterology Nurse Practitioner  _______________________________________________________________  S: Pain is still present.     O:  Blood pressure 104/61, pulse 61, temperature 98.8  F (37.1  C), temperature source Oral, resp. rate 18, height 1.626 m (5' 4.02\"), weight 95.3 kg (210 lb), SpO2 97 %, not currently breastfeeding.    Constitutional: cooperative, pleasant, not dyspneic/diaphoretic, no acute distress  Eyes: Sclera anicteric/injected  Ears/nose/mouth/throat: Normal oropharynx without ulcers or exudate, mucus membranes moist, hearing intact  Neck: supple, thyroid normal size  CV: RRR. No edema in LE.  Respiratory: Unlabored breathing. CTA bilaterally.  Lymph: No axillary, submandibular, supraclavicular or inguinal lymphadenopathy  Abd: Nondistended, hypoactive bs, no hepatosplenomegaly. Tenderness in epigastric region and RUQ. Positive for rebound.  Skin: warm, perfused, no jaundice  Neuro: AAO x 3, No asterixis  Psych: Normal affect  MSK: Normal gait    LABS:  BMP  Recent Labs  Lab 03/20/17  1942 03/20/17  1310 03/20/17  0612    138 142   POTASSIUM 3.5 3.8 3.8   CHLORIDE 109 106 109   FRANCINE 8.3* 8.2* " 9.3   CO2 23 22 23   BUN 5* 6* 9   CR 0.65 0.56 0.67   * 122* 106*     CBC  Recent Labs  Lab 03/20/17 1942 03/20/17  1310 03/20/17  0612   WBC 15.4* 18.1* 11.4*   RBC 4.31 4.30 4.57   HGB 13.2 13.3 14.3   HCT 38.9 39.1 41.4   MCV 90 91 91   MCH 30.6 30.9 31.3   MCHC 33.9 34.0 34.5   RDW 12.7 12.8 12.4    322 376     INR  Recent Labs  Lab 03/20/17 1310 03/20/17  0612   INR 1.05 1.00     LFTs  Recent Labs  Lab 03/20/17 1942 03/20/17 1310 03/20/17  0612   ALKPHOS 91 97 106   AST 6 10 16   ALT 21 21 27   BILITOTAL 0.5 0.3 0.2   PROTTOTAL 6.8 7.4 7.9   ALBUMIN 3.5 3.8 3.9      PANC  Recent Labs  Lab 03/20/17 1942 03/20/17  1310 03/20/17  0612   LIPASE 116 120 177       HPI:  Candi Saez is a 27 year old female with a history of asthma, chronic anemia, and now presenting with epigastric pain radiating to RUQ, n/v and leukocytosis. Abdominal US showed cholelithiasis w/o cholecystitis or biliary dilation. Lipase and LFTs are normal. Abdominal x-ray did not show acute abdomen.     Epigastric pain started gradually last night after eating a pizza and continued to get worse, which kept her awake. Then later she developed nausea and started throwing up. Patient is unable to answer questions as she is in pain at this moment.    ROS: A comprehensive Review of Systems was asked and answered in the negative unless specifically commented upon in the HPI    PMHx:  Past Medical History   Diagnosis Date     Anemia      Asthma        PSurgHx:   History reviewed. No pertinent past surgical history.    MEDS:    No current facility-administered medications on file prior to encounter.   Current Outpatient Prescriptions on File Prior to Encounter:  omeprazole (PRILOSEC) 40 MG capsule Take 1 capsule (40 mg) by mouth daily Take 30-60 minutes before a meal.   diphenoxylate-atropine (LOMOTIL) 2.5-0.025 MG per tablet Take 1-2 tablets by mouth 4 times daily as needed for diarrhea   ondansetron (ZOFRAN-ODT) 8 MG  disintegrating tablet Take 8 mg by mouth every 8 hours as needed for nausea   ibuprofen (ADVIL,MOTRIN) 200 MG tablet Take 400 mg by mouth every 6 hours as needed for mild pain   loratadine (CLARITIN) 10 MG tablet Take 10 mg by mouth daily as needed for allergies   diphenhydrAMINE HCl 50 MG TABS Take 25-50 mg by mouth every 6 hours as needed (allergies)   multivitamin, therapeutic with minerals (THERA-VIT-M) TABS Take 1 tablet by mouth daily   sucralfate (CARAFATE) 1 GM tablet Take 1 tablet (1 g) by mouth 4 times daily   acetaminophen (TYLENOL) 500 MG tablet Take 2 tablets (1,000 mg) by mouth every 8 hours as needed for fever or pain   Estradiol Cypionate (DEPO-ESTRADIOL IM) Inject into the muscle every 3 months        ALLERGIES:    No Known Allergies    FHx:  Family History   Problem Relation Age of Onset     DIABETES Paternal Grandmother      Hypertension Paternal Grandmother      CEREBROVASCULAR DISEASE Paternal Grandmother        SOCIAL Hx:  Social History     Social History     Marital status: Single     Spouse name: N/A     Number of children: N/A     Years of education: N/A     Occupational History     Not on file.     Social History Main Topics     Smoking status: Former Smoker     Years: 0.50     Smokeless tobacco: Never Used     Alcohol use Yes      Comment: occ     Drug use: No     Sexual activity: Yes     Partners: Male     Birth control/ protection: Injection     Other Topics Concern     Not on file     Social History Narrative

## 2017-03-21 NOTE — PLAN OF CARE
"Problem: Goal Outcome Summary  Goal: Goal Outcome Summary  Outcome: No Change  Observation charting     /68 (BP Location: Right arm)  Pulse 78  Temp 98.5  F (36.9  C) (Oral)  Resp 18  Ht 1.626 m (5' 4.02\")  Wt 95.3 kg (210 lb)  SpO2 98%  Breastfeeding? No  BMI 36.03 kg/m2     Goals:  -vital signs normal or at patient baseline: yes  -tolerating oral intake to maintain hydration: No, NPO  -adequate pain control on oral analgesics: no, IV dilaudid for pain relief  -tolerating oral antibiotics or has plans for home infusion setup: No, NPO and using IV antibiotics  -infection is improving: NA  -returns to baseline functional status: yes, up independent in room with adequate pain control using IV dilaudid  -safe disposition plan has been identified : yes, plans to discharge home after procedure     Pt a/o x4, denies n/v.  C/o abdominal pain, taking IV dilaudid for pain.  Up independently in room.  Consent for procedure on   Chart.  Will continue to monitor per POC.      "

## 2017-03-21 NOTE — DISCHARGE SUMMARY
Observation Unit Transfer Summary     Patient ID:  Candi Saez  MRN: [id number]  27 year old  YOB: 1990    Observation Admit Date: 3/20/2017    ED Admitting Attending: Jeanie Morrissey MD    Transfer Date and Time: March 20, 2017 at 7:39 PM     Transferring Observation Provider: CHAUNCEY Kc CNP    Admission Diagnoses:     1. Cholelithiasis without cholecystitis    2. Epigastric pain    3. Intractable vomiting with nausea, unspecified vomiting type        Transfer Diagnoses:  Cystic Duct Stone Obstruction  Leukocytosis    Emergency Department and Observation Course:   Assessment and Plan: 27 year old female with past medical history of gastritis and childhood asthma presenting with Epigastric/Mid-Sternal Chest/RUQ/LUQ pain, nausea and vomiting since 02:00 this morning.  Significant Epigastric, RUQ, LUQ tenderness. + Arroyo's sign.  Ultrasound showed cholelithiasis without biliary dilatation or cholecystitis. Afebrile. WBC 11.4. Lipase and LFTs within normal limits. Marginal pain relief with Hydromorphone. Makes her sleepy, once awake, constant RUQ pain. Troponin negative. EKG sinus rhythm without ST-T changes. No CVA tenderness. I spoke to GI who recommended HIDA scan given gallstones/intractable pain.   ( Please see ED physician note & ED Obs [facility] for further details).     03/20/2017 @ 13:30: Discussed case with Dr. Hearn and surgery resident Dr. Najera, including repeat elevated WBC 18.1/Absolute Neutrophils 16.6. Lactic acid 1.6. Dr. Najera not convinced source of pain is Candi's gallbladder-> obtain 1 view abdomen x-ray rule out free air ->(CT not necessary at this time) and GI consult for possible upper endoscopy for peptic ulcer disease work-up.  Per Dr. Najera:   - Trend CBC & LFTs  -- Recommend gastroenterology consult for EGD to evaluate for peptic ulcer disease  -- Will follow results of HIDA scan; would still recommend GI evaluation given false positive rate of HIDA  --  Keep NPO for possible procedures tomorrow  -- Continue PRN pain control, IVF, antiemetics PRN  -- Surgery will follow  ( See Dr. Najera's consult note for further details).     03/20/2017 @ 13:37: HIDA scan scheduled for 4 pm. Keep NPO. Hold all morphine derivatives per nuc med.  Relayed above information to patient's primary RN.  Lab work @ 13:15: CRP 3.1; BUN 6; Creatinine 0.56; Hgb 13.3; Glucose 122, Hgb 13.3; HCG Quant negative; Mononucleosis negative; Ethanol < 0.01  - Trend CBC, LFTs, lipase & CRP every 6 hours (next draw at 19:30 tonight- already ordered).     03/20/2017 @ 15:20: Per GI-> XR abdomen 1 view shows small stool burden right side of abdomen; No free air and nonobstructive bowel gas pattern.   - Miralax po daily; Pink lady enema-> Clear liquid diet, no reds->Tramadol po->GI to see in am     03/20/2017 @ 18:30: Radiologist called with HIDA scan results: Gallbladder never fills, thus positive scan-> cystic duct stone obstruction  -- Keep patient NPO; Will page surgery. Discontinue Miralax and Pink Lady.     03/20/2017 @ 18:45: Surgery called, spoke to Dr. Perla-> Given hiatal scan results. Primary surgical team to see patient in the morning. NPO after midnight. IV Zosyn ordered. Continue with plan per Dr. Najera. Informed Dr. Perla-patient has failed observation status due to positive HIDA scan, leukocytosis and intractable pain. Surgery requests medicine admission. Will page medicine triage for inpatient admit.     03/20/2017 @ 19:05: Spoke to medicine triage-declined patient-> more appropriate for surgery team.     03/20/2017 @ 19:15: Per Dr. Hearn, page attending general surgery, Dr. Castillo, who accepted patient. Notified Dr. Perla.      At this time the patient has failed observation management due to cystic duct stone obstruction and will be transferred to inpatient status.    Consults: Surgery, GI, Medicine triage    DATA:    Transfer Exam:    /84  Pulse 78  Temp 97.9  F (36.6  C) (Oral)  Resp 18  " Ht 1.626 m (5' 4.02\")  Wt 95.3 kg (210 lb)  SpO2 97%  Breastfeeding? No  BMI 36.03 kg/m2  GENERAL: Sleepy, easily wakes to voice. Appears uncomfortable.  HEENT: Anicteric sclera. PERRL. Mucous membranes moist and without lesions.    CV: RRR. S1, S2. No murmurs appreciated.    RESPIRATORY: Effort normal. Lungs CTAB with no wheezing, rales, rhonchi.    GI: Hypoactive bowel sounds RUQ, RLQ, LUQ. Active bowel sounds LUQ.  Rebound tenderness present. Epigastric tenderness with light and deep palpation; Positive Arroyo's sign; Negative Psoas' sign. No guarding.   MUSCULOSKELETAL: No CVA Tenderness. No joint swelling or tenderness. Moves all extremities.    NEUROLOGICAL: No focal deficits. Strength 5/5 bilaterally in upper and lower extremities.    EXTREMITIES: No peripheral edema. Intact bilateral pedal pulses.    SKIN: No jaundice. No rashes    Current Medications:    Current Outpatient Prescriptions   Medication Sig Dispense Refill     HYDROcodone-acetaminophen (NORCO) 5-325 MG per tablet Take 1-2 tablets by mouth every 4 hours as needed for moderate to severe pain 15 tablet 0     ondansetron (ZOFRAN ODT) 4 MG ODT tab Take 1 tablet (4 mg) by mouth every 8 hours as needed for nausea 10 tablet 0       Medications Prior to Admission:    Prescriptions Prior to Admission   Medication Sig Dispense Refill Last Dose     RaNITidine HCl (ZANTAC PO) Take 150 mg by mouth daily as needed for heartburn   3/20/2017 at 0130     omeprazole (PRILOSEC) 40 MG capsule Take 1 capsule (40 mg) by mouth daily Take 30-60 minutes before a meal. 30 capsule 1 3/20/2017 at Unknown time     diphenoxylate-atropine (LOMOTIL) 2.5-0.025 MG per tablet Take 1-2 tablets by mouth 4 times daily as needed for diarrhea   Unknown at Unknown time     ondansetron (ZOFRAN-ODT) 8 MG disintegrating tablet Take 8 mg by mouth every 8 hours as needed for nausea   More than a month at Unknown time     ibuprofen (ADVIL,MOTRIN) 200 MG tablet Take 400 mg by mouth every 6 " hours as needed for mild pain   More than a month at Unknown time     loratadine (CLARITIN) 10 MG tablet Take 10 mg by mouth daily as needed for allergies   More than a month at Unknown time     diphenhydrAMINE HCl 50 MG TABS Take 25-50 mg by mouth every 6 hours as needed (allergies)   More than a month at Unknown time     multivitamin, therapeutic with minerals (THERA-VIT-M) TABS Take 1 tablet by mouth daily   More than a month at Unknown time     sucralfate (CARAFATE) 1 GM tablet Take 1 tablet (1 g) by mouth 4 times daily 40 tablet 1 More than a month at Unknown time     acetaminophen (TYLENOL) 500 MG tablet Take 2 tablets (1,000 mg) by mouth every 8 hours as needed for fever or pain 60 tablet 0 More than a month at Unknown time     Estradiol Cypionate (DEPO-ESTRADIOL IM) Inject into the muscle every 3 months    11/17/2015       Significant Diagnostic Studies:     Results for orders placed or performed during the hospital encounter of 03/20/17   US Abdomen Limited    Narrative    ULTRASOUND ABDOMEN LIMITED March 20, 2017 7:28 AM      HISTORY: Right upper quadrant pain.     COMPARISON: None.    FINDINGS: The liver is normal in size and texture without focal mass.  There is no intra or extrahepatic biliary dilatation. The common  hepatic duct measures 0.4 cm. There are stones in the gallbladder. The  gallbladder otherwise appears normal.  The pancreas head and body  appear normal. The tail is obscured by bowel gas.  The right kidney  measures 10.6 cm and is normal in appearance.       Impression    IMPRESSION: Cholelithiasis. There is no biliary dilatation or evidence  of cholecystitis.    YAMIL STREETER MD   NM HepatOBiliary Scan    Narrative    Resident prelim:  1. Abnormal hepatobiliary scan with nonvisualization of the  gallbladder consistent with cystic duct obstruction.  2. Enterogastric reflux.     XR Abdomen Port 1 View    Narrative    Exam: Supine portable view abdomen dated 3/20/2017    HISTORY: Abdominal  pain    COMPARISON: 2/23/2016    FINDINGS: No free intraperitoneal air seen on this supine film. Small  amount of stool in the right colon. No significant small bowel gas. No  pneumatosis identified.      Impression    IMPRESSION: No free air or pneumatosis identified. This is a supine  film. Nonobstructive bowel gas pattern.    BISI VALERIO MD   CBC with platelets differential   Result Value Ref Range    WBC 11.4 (H) 4.0 - 11.0 10e9/L    RBC Count 4.57 3.8 - 5.2 10e12/L    Hemoglobin 14.3 11.7 - 15.7 g/dL    Hematocrit 41.4 35.0 - 47.0 %    MCV 91 78 - 100 fl    MCH 31.3 26.5 - 33.0 pg    MCHC 34.5 31.5 - 36.5 g/dL    RDW 12.4 10.0 - 15.0 %    Platelet Count 376 150 - 450 10e9/L    Diff Method Automated Method     % Neutrophils 56.2 %    % Lymphocytes 38.3 %    % Monocytes 3.4 %    % Eosinophils 1.7 %    % Basophils 0.1 %    % Immature Granulocytes 0.3 %    Nucleated RBCs 0 0 /100    Absolute Neutrophil 6.4 1.6 - 8.3 10e9/L    Absolute Lymphocytes 4.4 0.8 - 5.3 10e9/L    Absolute Monocytes 0.4 0.0 - 1.3 10e9/L    Absolute Eosinophils 0.2 0.0 - 0.7 10e9/L    Absolute Basophils 0.0 0.0 - 0.2 10e9/L    Abs Immature Granulocytes 0.0 0 - 0.4 10e9/L    Absolute Nucleated RBC 0.0    INR   Result Value Ref Range    INR 1.00 0.86 - 1.14   Comprehensive metabolic panel   Result Value Ref Range    Sodium 142 133 - 144 mmol/L    Potassium 3.8 3.4 - 5.3 mmol/L    Chloride 109 94 - 109 mmol/L    Carbon Dioxide 23 20 - 32 mmol/L    Anion Gap 10 3 - 14 mmol/L    Glucose 106 (H) 70 - 99 mg/dL    Urea Nitrogen 9 7 - 30 mg/dL    Creatinine 0.67 0.52 - 1.04 mg/dL    GFR Estimate >90  Non  GFR Calc   >60 mL/min/1.7m2    GFR Estimate If Black >90   GFR Calc   >60 mL/min/1.7m2    Calcium 9.3 8.5 - 10.1 mg/dL    Bilirubin Total 0.2 0.2 - 1.3 mg/dL    Albumin 3.9 3.4 - 5.0 g/dL    Protein Total 7.9 6.8 - 8.8 g/dL    Alkaline Phosphatase 106 40 - 150 U/L    ALT 27 0 - 50 U/L    AST 16 0 - 45 U/L   Lipase    Result Value Ref Range    Lipase 177 73 - 393 U/L   Troponin I   Result Value Ref Range    Troponin I ES  0.000 - 0.045 ug/L     <0.015  The 99th percentile for upper reference range is 0.045 ug/L.  Troponin values in   the range of 0.045 - 0.120 ug/L may be associated with risks of adverse   clinical events.     Mononucleosis screen   Result Value Ref Range    Mononucleosis Screen Negative NEG   CBC with platelets differential   Result Value Ref Range    WBC 18.1 (H) 4.0 - 11.0 10e9/L    RBC Count 4.30 3.8 - 5.2 10e12/L    Hemoglobin 13.3 11.7 - 15.7 g/dL    Hematocrit 39.1 35.0 - 47.0 %    MCV 91 78 - 100 fl    MCH 30.9 26.5 - 33.0 pg    MCHC 34.0 31.5 - 36.5 g/dL    RDW 12.8 10.0 - 15.0 %    Platelet Count 322 150 - 450 10e9/L    Diff Method Automated Method     % Neutrophils 91.8 %    % Lymphocytes 6.0 %    % Monocytes 1.9 %    % Eosinophils 0.0 %    % Basophils 0.1 %    % Immature Granulocytes 0.2 %    Nucleated RBCs 0 0 /100    Absolute Neutrophil 16.6 (H) 1.6 - 8.3 10e9/L    Absolute Lymphocytes 1.1 0.8 - 5.3 10e9/L    Absolute Monocytes 0.3 0.0 - 1.3 10e9/L    Absolute Eosinophils 0.0 0.0 - 0.7 10e9/L    Absolute Basophils 0.0 0.0 - 0.2 10e9/L    Abs Immature Granulocytes 0.0 0 - 0.4 10e9/L    Absolute Nucleated RBC 0.0    Partial thromboplastin time   Result Value Ref Range    PTT 27 22 - 37 sec   INR   Result Value Ref Range    INR 1.05 0.86 - 1.14   HCG quantitative pregnancy   Result Value Ref Range    HCG Quantitative Serum <1 0 - 5 IU/L   Comprehensive metabolic panel   Result Value Ref Range    Sodium 138 133 - 144 mmol/L    Potassium 3.8 3.4 - 5.3 mmol/L    Chloride 106 94 - 109 mmol/L    Carbon Dioxide 22 20 - 32 mmol/L    Anion Gap 11 3 - 14 mmol/L    Glucose 122 (H) 70 - 99 mg/dL    Urea Nitrogen 6 (L) 7 - 30 mg/dL    Creatinine 0.56 0.52 - 1.04 mg/dL    GFR Estimate >90  Non  GFR Calc   >60 mL/min/1.7m2    GFR Estimate If Black >90   GFR Calc   >60 mL/min/1.7m2     Calcium 8.2 (L) 8.5 - 10.1 mg/dL    Bilirubin Total 0.3 0.2 - 1.3 mg/dL    Albumin 3.8 3.4 - 5.0 g/dL    Protein Total 7.4 6.8 - 8.8 g/dL    Alkaline Phosphatase 97 40 - 150 U/L    ALT 21 0 - 50 U/L    AST 10 0 - 45 U/L   Alcohol ethyl   Result Value Ref Range    Ethanol g/dL <0.01 <0.01 g/dL   Lactic acid whole blood   Result Value Ref Range    Lactic Acid 1.6 0.7 - 2.1 mmol/L   CRP inflammation   Result Value Ref Range    CRP Inflammation 3.1 0.0 - 8.0 mg/L   Lipase   Result Value Ref Range    Lipase 120 73 - 393 U/L   EKG 12 lead   Result Value Ref Range    Interpretation ECG Click View Image link to view waveform and result    Surgery General Adult IP Consult: Patient to be seen: ASAP within 4 hrs; Call back #: # [id number]; Epigastric, RUQ/LUQ abdominal pain- intractable nausea & vomiting- Choleliathiasis on ultrasound; Consultant may enter orders: Yes    Narrative    Damien Najera MD     3/20/2017  6:24 PM  GENERAL SURGERY HISTORY AND PHYSICAL    ASSESSMENT/PLAN: Candi Saez is a 27-year-old female with   epigastric abdominal pain and a history of gastritis. Given   ibuprofen use and gastritis history, peptic ulcer disease is on   the differential. On exam, she is tender in both upper quadrants.   Her gallbladder is normal appearing on ultrasound. Given these   findings, acute cholecystitis is less likely. Biliary colic is   still possible given gallstones on ultrasound.    -- Trend CBC and LFTs  -- Recommend gastroenterology consult for EGD to evaluate for   peptic ulcer disease  -- Will follow results of HIDA scan; would still recommend GI   evaluation given false positive rate of HIDA  -- Keep NPO for possible procedures tomorrow  -- Continue PRN pain control, IVF, antiemetics PRN  -- Surgery will follow    Discussed with Dr. Castillo.    Damien Najera MD  PGY-2, General Surgery  Pager: [phone]    - - - - - - - - - -    CHIEF COMPLAINT: abdominal pain    HISTORY OF PRESENT ILLNESS: Candi  Jaci Saez is a 27-year-old   female with burning epigastric pain since 2am this morning. The   pain started as acid reflux and progressed to burning epigastric   pain. The pain radiates to her back. She felt nauseated and   vomited once at home and once in the ED. The pain is now located   in both upper quadrants and her epigastrium. She has previously   had pain like this and was diagnosed with gastritis on EGD. She   tried taking ranitidine and omeprazole at home but this did not   help. She denies bloody emesis. She reports taking 400 mg of   ibuprofen at least three times per week due to aches and pains   from her job as a . She denies diarrhea. She   reports constipation. She denies fevers, chills.     REVIEW OF SYSTEMS: A 10 point review of systems was asked and   negative except as above.     PAST MEDICAL HISTORY:   Gastritis    SURGICAL HISTORY:   History reviewed. No pertinent past surgical history.    SOCIAL HISTORY:   Social History     Social History     Marital status: Single     Spouse name: N/A     Number of children: N/A     Years of education: N/A     Social History Main Topics     Smoking status: Former Smoker     Years: 0.50     Smokeless tobacco: Never Used     Alcohol use Yes      Comment: occ     Drug use: No     Sexual activity: Yes     Partners: Male     Birth control/ protection: Injection     Other Topics Concern     None     Social History Narrative       FAMILY HISTORY:   Family History   Problem Relation Age of Onset     DIABETES Paternal Grandmother      Hypertension Paternal Grandmother      CEREBROVASCULAR DISEASE Paternal Grandmother    Reports history of DVT in her sister and grandmother.     ALLERGIES:    No Known Allergies    MEDICATIONS:    No current facility-administered medications on file prior to   encounter.   Current Outpatient Prescriptions on File Prior to Encounter:  omeprazole (PRILOSEC) 40 MG capsule Take 1 capsule (40 mg) by   mouth daily Take  30-60 minutes before a meal.   diphenoxylate-atropine (LOMOTIL) 2.5-0.025 MG per tablet Take 1-2   tablets by mouth 4 times daily as needed for diarrhea   ondansetron (ZOFRAN-ODT) 8 MG disintegrating tablet Take 8 mg by   mouth every 8 hours as needed for nausea   ibuprofen (ADVIL,MOTRIN) 200 MG tablet Take 400 mg by mouth every   6 hours as needed for mild pain   loratadine (CLARITIN) 10 MG tablet Take 10 mg by mouth daily as   needed for allergies   diphenhydrAMINE HCl 50 MG TABS Take 25-50 mg by mouth every 6   hours as needed (allergies)   multivitamin, therapeutic with minerals (THERA-VIT-M) TABS Take 1   tablet by mouth daily   sucralfate (CARAFATE) 1 GM tablet Take 1 tablet (1 g) by mouth 4   times daily   acetaminophen (TYLENOL) 500 MG tablet Take 2 tablets (1,000 mg)   by mouth every 8 hours as needed for fever or pain   Estradiol Cypionate (DEPO-ESTRADIOL IM) Inject into the muscle   every 3 months        PHYSICAL EXAM:   /73  Pulse 78  Temp 98  F (36.7  C) (Oral)  Resp 18  Wt   95.3 kg (210 lb)  SpO2 99%  Breastfeeding? No  BMI 36.05 kg/m2  General: Somnolent female in moderate distress.  HEENT: Normocephalic, atraumatic. Patent nares. EOMI. PERRLA.  Chest wall: Symmetric thorax.   Respiratory: Non-labored breathing on room air.  Cardiovascular: Regular rate and rhythm.   Gastrointestinal: Abdomen soft, non-distended, tender to   palpation at epigastrium, LUQ, and RUQ. No rebound or guarding.   Extremities: Moving all four extremities. No limb deformities. No   pedal edema. Peripheral pulses palpable in all distal   extremities.  Skin: No rashes or lesions appreciated.    LAB DATA: Reviewed.   BMP WNL  Albumin 3.9  T bili 0.2  Alk phos 106  ALT 27  AST 16  Lipase 177  Troponin <0.015  Glucose 106  WBC 11.4  Hgb 14.3  Plt 376  INR 1.00    IMAGING:   US abdomen 3/20/2017:  FINDINGS: The liver is normal in size and texture without focal   mass.  There is no intra or extrahepatic biliary dilatation. The  common  hepatic duct measures 0.4 cm. There are stones in the   gallbladder. The  gallbladder otherwise appears normal. The pancreas head and body  appear normal. The tail is obscured by bowel gas. The right   kidney  measures 10.6 cm and is normal in appearance.   IMPRESSION: Cholelithiasis. There is no biliary dilatation or   evidence  of cholecystitis.     Troponin POCT   Result Value Ref Range    Troponin I 0.00 0.00 - 0.10 ug/L   ABO/Rh type and screen   Result Value Ref Range    ABO B     RH(D)  Pos     Antibody Screen Neg     Test Valid Only At       [facility]    Specimen Expires 03/23/2017          Discussed case with Dr. Hearn.    Signed:  CHAUNCEY Kc, CNP  ED Observation Unit  [facility]  March 20, 2017 at 7:39 PM

## 2017-03-21 NOTE — OP NOTE
PRE-OPERATIVE DIAGNOSIS:  Acute cholecystitis  POST-OPERATIVE DIAGNOSIS: Acute cholecystitis  PROCEDURE: Laparoscopic cholecystectomy.  Modifier 22  ANESTHESIA: General  SURGEON: Lizzy Castillo MD  RESIDENT: Reinaldo Estes  EBL: 20 ml  COMORBIDITIES:     Morbid Obesity  Patient Active Problem List   Diagnosis     Abdominal pain      SPECIMEN: Gallbladder  FINDINGS: Acute on chronic cholecystitis  Body mass index is 36.03 kg/(m^2). This case was 40% more difficult due to habitus.    INDICATIONS FOR PROCEDURE: Candi Saez is a 27 year old female with signs and symptoms of acute cholecystitis. Laparoscopic cholecystectomy is recommended.  Risks benefits and alternatives are discussed and all agree to proceed with the operation as planned.  DESCRIPTION OF PROCEDURE: The patient was brought to the operating room and placed in a comfortable supine position. After induction of general anesthesia was prepped and draped in a sterile fashion. A timeout was performed.   Using a mini-open technique, a periumbilical incision was utilized to enter the abdomen taking care to avoid injury to structures below.  A 5-mm port was inserted in the epigastrium and two 5 mm lateral ports inserted without incident. A 5-mm scope was inserted through the periumbilical incision. Upon entry into the abdomen, no injuries were identified. The gallbladder was identified and was noted to be distended and inflamed with adherent fat. Visualization was difficult due to an enlarged liver consistent with obesity. After placing the patient in reverseTrendelenburg position with the bed to the patient left, superior lateral retraction of the gallbladder was achieved.  The gallbladder was aspirated. The left lobe of the liver flopped into view.  We spent approximately 30 minutes repositioning until we could safely proceed. Using the 2 lateral graspers, the infundibulum was grasped and retracted cephalad.  Surrounding adhesions were removed. The  cystic duct was identified and skeletonized.  Throughout the procedure, we had to stop to reestablish a safe view. The cystic artery was identified and skeletonized. Prior to division, universal agreement was established with all members of the physician team that this was in fact the duct consistent with the critical view of safety.  The cystic artery was then clipped and transected. The cystic duct was skeletonized a bit further then clipped and transected. The gallbladder was dissected off the liver bed with electrocautery.  Prior to removal of the gallbladder from the liver bed, the bed was inspected and no significant bleeding was noted. After removal of the specimen without incident, the infraumbilical incision was closed with 0 Vicryl suture. After complete release of pneumoperitoneum, all the ports were removed and skin was closed using Monocryl suture and application of Dermabond.   The patient awoke from anesthesia at this point without incident and transferred to the PACU for postoperative recovery. Specimens were sent to pathology. No acute complications of the procedure were identified.

## 2017-03-22 VITALS
OXYGEN SATURATION: 96 % | HEIGHT: 64 IN | SYSTOLIC BLOOD PRESSURE: 128 MMHG | WEIGHT: 210 LBS | TEMPERATURE: 98.6 F | HEART RATE: 61 BPM | DIASTOLIC BLOOD PRESSURE: 67 MMHG | BODY MASS INDEX: 35.85 KG/M2 | RESPIRATION RATE: 16 BRPM

## 2017-03-22 PROCEDURE — 25000132 ZZH RX MED GY IP 250 OP 250 PS 637: Performed by: SURGERY

## 2017-03-22 PROCEDURE — G0378 HOSPITAL OBSERVATION PER HR: HCPCS

## 2017-03-22 PROCEDURE — 99224 ZZC SUBSEQUENT OBSERVATION CARE,LEVEL I: CPT | Performed by: NURSE PRACTITIONER

## 2017-03-22 PROCEDURE — 25000125 ZZHC RX 250: Performed by: NURSE PRACTITIONER

## 2017-03-22 PROCEDURE — 99207 ZZC CDG-CODE CATEGORY CHANGED: CPT | Performed by: NURSE PRACTITIONER

## 2017-03-22 PROCEDURE — 96376 TX/PRO/DX INJ SAME DRUG ADON: CPT

## 2017-03-22 PROCEDURE — 25000132 ZZH RX MED GY IP 250 OP 250 PS 637: Performed by: NURSE PRACTITIONER

## 2017-03-22 RX ORDER — HYDROCODONE BITARTRATE AND ACETAMINOPHEN 5; 325 MG/1; MG/1
1-2 TABLET ORAL EVERY 4 HOURS PRN
Qty: 15 TABLET | Refills: 0 | Status: SHIPPED | OUTPATIENT
Start: 2017-03-22 | End: 2017-12-18

## 2017-03-22 RX ORDER — POLYETHYLENE GLYCOL 3350 17 G/17G
17 POWDER, FOR SOLUTION ORAL DAILY
Qty: 7 PACKET | Refills: 0 | Status: SHIPPED | OUTPATIENT
Start: 2017-03-22 | End: 2017-12-18

## 2017-03-22 RX ORDER — OXYCODONE HYDROCHLORIDE 5 MG/1
5-10 TABLET ORAL EVERY 4 HOURS PRN
Status: DISCONTINUED | OUTPATIENT
Start: 2017-03-22 | End: 2017-03-22 | Stop reason: HOSPADM

## 2017-03-22 RX ADMIN — OXYCODONE HYDROCHLORIDE 10 MG: 5 TABLET ORAL at 11:33

## 2017-03-22 RX ADMIN — POLYETHYLENE GLYCOL 3350 17 G: 17 POWDER, FOR SOLUTION ORAL at 08:07

## 2017-03-22 RX ADMIN — PANTOPRAZOLE SODIUM 40 MG: 40 INJECTION, POWDER, FOR SOLUTION INTRAVENOUS at 08:07

## 2017-03-22 RX ADMIN — OXYCODONE HYDROCHLORIDE 10 MG: 5 TABLET ORAL at 06:44

## 2017-03-22 RX ADMIN — TRAMADOL HYDROCHLORIDE 100 MG: 50 TABLET, COATED ORAL at 03:38

## 2017-03-22 ASSESSMENT — PAIN DESCRIPTION - DESCRIPTORS: DESCRIPTORS: SORE

## 2017-03-22 NOTE — DISCHARGE SUMMARY
SURGERY DISCHARGE SUMMARY  Patient Name: Candi Saez  MR#: [id number]  Date of Admission: 3/20/2017  5:54 AM  Date of Discharge: 3/22/2017  Admitting Physician: Dr. Castillo  Operating Physician: Dr. Castillo  Discharging Physician: Dr. Castillo    Discharge Diagnoses:  1. Right upper quadrant abdominal pain    2. Cholelithiasis without cholecystitis    3. Epigastric pain    4. Intractable vomiting with nausea, unspecified vomiting type    5. Chest pain, unspecified        Procedures Performed this admission:  Laparoscopic cholecystectomy 3/21/17    Consultations:  Gastroenterology     Brief HPI:  Candi Saez is a 27-year-old female with burning epigastric pain since 2am this morning 3/21. The pain started as acid reflux and progressed to burning epigastric pain. The pain radiates to her back. She felt nauseated and vomited once at home and once in the ED. The pain is now located in both upper quadrants and her epigastrium. She has previously had pain like this and was diagnosed with gastritis on EGD. She tried taking ranitidine and omeprazole at home but this did not help. She denies bloody emesis. She reports taking 400 mg of ibuprofen at least three times per week due to aches and pains from her job as a . She denies diarrhea. She reports constipation. She denies fevers, chills. HIDA scan concerning for acute cholecystitis.    Hospital Course:   Candi Saez was admitted s/p the aforementioned procedure which the patient tolerated well. The patient was transferred to the general floor for postoperative recovery. Cardiopulmonary and renal status remained stable throughout the admission. Diet was advanced and patient achieved full return of bowel function.      On day of discharge, the patient was deemed to be in stable and improved condition and discharged with appropriate follow up instructions. At that time the patient was tolerating a regular diet with return of normal  "bowel function, pain was controlled with oral medications and the patient was ambulating and voiding independently. She reported mild abdominal pain, but was not taking pain meds overnight. She denied nausea/vomiting.    Pathology:  Surgical pathology pending    Pending Test Results:   None    Discharge Exam:  /67 (BP Location: Right arm)  Pulse 61  Temp 98.6  F (37  C) (Oral)  Resp 16  Ht 1.626 m (5' 4.02\")  Wt 95.3 kg (210 lb)  SpO2 96%  Breastfeeding? No  BMI 36.03 kg/m2.  A&Ox3, NAD laying in bed  Breathing non-labored on RA  Warm well perfused  Soft, obese, ND, diffusely moderately tender, non peritonitic, non tympanic  Distal extremities warm.     Medications on Discharge:      Review of your medicines      START taking       Dose / Directions    HYDROcodone-acetaminophen 5-325 MG per tablet   Commonly known as:  NORCO        Dose:  1-2 tablet   Take 1-2 tablets by mouth every 4 hours as needed for moderate to severe pain   Quantity:  15 tablet   Refills:  0       polyethylene glycol Packet   Commonly known as:  MIRALAX/GLYCOLAX        Dose:  17 g   Take 17 g by mouth daily   Quantity:  7 packet   Refills:  0         CONTINUE these medicines which may have CHANGED, or have new prescriptions. If we are uncertain of the size of tablets/capsules you have at home, strength may be listed as something that might have changed.       Dose / Directions    * ondansetron 8 MG ODT tab   Commonly known as:  ZOFRAN-ODT   This may have changed:  Another medication with the same name was added. Make sure you understand how and when to take each.        Dose:  8 mg   Take 8 mg by mouth every 8 hours as needed for nausea   Refills:  0       * ondansetron 4 MG ODT tab   Commonly known as:  ZOFRAN ODT   This may have changed:  You were already taking a medication with the same name, and this prescription was added. Make sure you understand how and when to take each.        Dose:  4 mg   Take 1 tablet (4 mg) by mouth " every 8 hours as needed for nausea   Quantity:  10 tablet   Refills:  0       * Notice:  This list has 2 medication(s) that are the same as other medications prescribed for you. Read the directions carefully, and ask your doctor or other care provider to review them with you.      CONTINUE these medicines which have NOT CHANGED       Dose / Directions    DEPO-ESTRADIOL IM        Inject into the muscle every 3 months   Refills:  0       diphenhydrAMINE HCl 50 MG Tabs        Dose:  25-50 mg   Take 25-50 mg by mouth every 6 hours as needed (allergies)   Refills:  0       diphenoxylate-atropine 2.5-0.025 MG per tablet   Commonly known as:  LOMOTIL        Dose:  1-2 tablet   Take 1-2 tablets by mouth 4 times daily as needed for diarrhea   Refills:  0       ibuprofen 200 MG tablet   Commonly known as:  ADVIL/MOTRIN        Dose:  400 mg   Take 400 mg by mouth every 6 hours as needed for mild pain   Refills:  0       loratadine 10 MG tablet   Commonly known as:  CLARITIN        Dose:  10 mg   Take 10 mg by mouth daily as needed for allergies   Refills:  0       multivitamin, therapeutic with minerals Tabs tablet        Dose:  1 tablet   Take 1 tablet by mouth daily   Refills:  0       omeprazole 40 MG capsule   Commonly known as:  priLOSEC   Used for:  Gastritis        Dose:  40 mg   Take 1 capsule (40 mg) by mouth daily Take 30-60 minutes before a meal.   Quantity:  30 capsule   Refills:  1       sucralfate 1 GM tablet   Commonly known as:  CARAFATE   Used for:  Gastritis        Dose:  1 g   Take 1 tablet (1 g) by mouth 4 times daily   Quantity:  40 tablet   Refills:  1       ZANTAC PO   Indication:  Gastroesophageal Reflux Disease        Dose:  150 mg   Take 150 mg by mouth daily as needed for heartburn   Refills:  0         STOP taking          acetaminophen 500 MG tablet   Commonly known as:  TYLENOL                Where to get your medicines      Some of these will need a paper prescription and others can be bought  over the counter. Ask your nurse if you have questions.     Bring a paper prescription for each of these medications      HYDROcodone-acetaminophen 5-325 MG per tablet     ondansetron 4 MG ODT tab     polyethylene glycol Packet             Discharge Procedure Orders  Reason for your hospital stay   Order Comments: Laparoscopic cholecystectomy     Adult [facility] Follow-up and recommended labs and tests   Order Comments: Follow up in the General Surgery clinic in 2 weeks.    Appointments on [place] and/or [facility] (with [facility] or [facility] provider or service). Call [phone] if you haven't heard regarding these appointments within 7 days of discharge.     Discharge Instructions   Order Comments: DIET  -Regular diet as prior to admission  -We recommend eating slowly, chewing thoroughly, eating small frequent meals throughout the day  -Stay well hydrated    ACTIVITY  -No lifting, pushing, pulling greater than 10 lbs and no strenuous exercise for 4 weeks   -No driving while on narcotic analgesics (i.e. Percocet, oxycodone, Vicodin)  -No driving until you are able to fully twist to both sides quickly and without any pain    WOUND CLINIC  -Inspect your wounds daily for signs of infection (increased redness, drainage, pain)  -Keep your wound clean and dry  -You may shower, but do not soak in tub or pool    NOTIFY  Please contact the emergency department for problems after discharge such as:  -Temperature > 101F, chills, rigors, dizziness  -Redness around or purulent drainage from wound  -Inability to tolerate diet, nausea or vomiting  -You stop passing gas (or your stoma stops functioning), develop significant bloating, abdominal pain  -Have blood in stools/vomit  -Have severe diarrhea/constipation  -Any other questions or concerns.  - At nights (after 4:30pm), on weekends, or if urgent, call [phone] and ask the  to speak with the on-call Surgery resident or fellow      Medication Instructions  Some of  your medications may have changed. Please take only prescribed and resumed medications     FOLLOW-UP  1. Follow up in the General Surgery clinic in 2 weeks.      *For other appointments on [place] and/or [facility] (with [facility] or [facility] provider or service): Call [phone] if you have not heard regarding these appointments within 7 days of discharge.         All patient's and family's concerns were addressed prior to discharge. Patient discussed with team on the day of discharge.    Damien Duncan MD  General Surgery  [phone]

## 2017-03-22 NOTE — PLAN OF CARE
Problem: Discharge Planning  Goal: Discharge Planning (Adult, OB, Behavioral, Peds)  Outpatient/Observation goals to be met before discharge home:     -diagnostic tests and consults completed and resulted: Yes  -vital signs normal or at patient baseline: Yes  -tolerating oral intake to maintain hydration: Yes.   -adequate pain control on oral analgesics: Yes.  -tolerating oral antibiotics or has plans for home infusion setup: No, pt has no scheduled antibiotics  -infection is improving: N/A  -returns to baseline functional status: Yes  -safe disposition plan has been identified: Yes  Patient reports pain well controlled with oral oxycodone.

## 2017-03-22 NOTE — PROGRESS NOTES
"Post-Op note    S:  Reports moderate abdominal pain. Denies chest pain/sob/nausea/vomiting.    O:  /83  Pulse 61  Temp 98.8  F (37.1  C) (Axillary)  Resp 18  Ht 1.626 m (5' 4.02\")  Wt 95.3 kg (210 lb)  SpO2 99%  Breastfeeding? No  BMI 36.03 kg/m2  A&Ox3, NAD sleeping, easily rousable  Breathing non-labored on RA  Warm well perfused  Soft, obese, ND, diffusely tender. Non peritonitc, non tympanic. Incisions appear c/d/i without drainage, erythema, bulging, or fluctuance.   Distal extremities warm.        Intake/Output Summary (Last 24 hours) at 03/21/17 1921  Last data filed at 03/21/17 1411   Gross per 24 hour   Intake             2900 ml   Output              170 ml   Net             2730 ml         BMP  Recent Labs  Lab 03/20/17 1942 03/20/17  1310 03/20/17  0612    138 142   POTASSIUM 3.5 3.8 3.8   CHLORIDE 109 106 109   FRANCINE 8.3* 8.2* 9.3   CO2 23 22 23   BUN 5* 6* 9   CR 0.65 0.56 0.67   * 122* 106*     CBC  Recent Labs  Lab 03/20/17 1942 03/20/17  1310 03/20/17  0612   WBC 15.4* 18.1* 11.4*   RBC 4.31 4.30 4.57   HGB 13.2 13.3 14.3   HCT 38.9 39.1 41.4   MCV 90 91 91   MCH 30.6 30.9 31.3   MCHC 33.9 34.0 34.5   RDW 12.7 12.8 12.4    322 376     INR  Recent Labs  Lab 03/20/17  1310 03/20/17  0612   INR 1.05 1.00      Liver Function Studies -   Recent Labs   Lab Test  03/20/17 1942   PROTTOTAL  6.8   ALBUMIN  3.5   BILITOTAL  0.5   ALKPHOS  91   AST  6   ALT  21          A/P: 27F s/p Laparoscopic cholecystectomy doing well.    -exparel, dilaudid, tramadol  -ADAT, NS 125cc/h  -protonix  -urge ambulation        Damien Duncan MD  General Surgery  [phone]                                      "

## 2017-03-22 NOTE — PROGRESS NOTES
"REGIONAL ANESTHESIA PAIN SERVICE  SUBJECTIVE: Pt reports abdominal pain that is getting better since switched to oxycodone this morning.  Pt states good pain control for all incisions except umbilical incision following nerve block injections for postoperative pain management.  Denies any weakness, paresthesias, circumoral numbness, metallic taste or tinnitus.  Patient is currently without nausea or vomiting.   Pt plans to discharge home later today     Clinically Aligned Pain Assessment (CAPA):  Comfort (How is your pain?): Tolerable with discomfort  Change in Pain (Since your last medication/intervention?): Getting better  Pain Control (How are your pain treatments working?):  Partially effective pain control     Numerical Pain Rating:  Reports pain manageable at rest and 7/10 after getting up to use the bathroom today     OBJECTIVE:    Blood pressure 128/67, pulse 61, temperature 98.6  F (37  C), temperature source Oral, resp. rate 16, height 1.626 m (5' 4.02\"), weight 95.3 kg (210 lb), SpO2 96 %, not currently breastfeeding.  Strength 5/5 and grossly symmetric in bilateral LE      ASSESSMENT/PLAN:    Candi Saez is a 27 year old female POD #1 s/p  LAPAROSCOPIC CHOLECYSTECTOMY with single shot bilateral transversus abdominis plane (TAP)  nerve block injections, bupivacaine 0.25% with epinephrine 1:200,000 20 mL, then liposome bupivacaine (Exparel) 1.3% 20mL given on 3/21 for postoperative analgesia.  Pt is ambulating without difficulty, no weakness or paresthesias.  No evidence of adverse side effects associated with nerve block injections. Pt is receiving adequate incisional pain control.  Anticipate up to 72 hours of incisional pain control.  Anticipate patient will require opioid/nonopioid analgesics for visceral and muscle pain not controlled with local anesthetic.        - NO other local anesthetic use within 96 hours of liposome bupivacaine (Exparel)  - patient received verbal and written " instructions about liposome bupivacaine  - please call if questions or concerns  - discussed plan with attending anesthesiologist    CHAUNCEY Garay [facility]  Regional Anesthesia Pain Service  3/22/2017 12:25 PM    24 hour Job Code Pager.  For in-house use only.     Dial * * *[phone] and  [place]:  [phone]  [place]: [phone]  Peds:  [phone]  Enter call-back number  May text page using [employer]ypio, but NOT [employer].

## 2017-03-22 NOTE — PLAN OF CARE
Problem: Discharge Planning  Goal: Discharge Planning (Adult, OB, Behavioral, Peds)  -diagnostic tests and consults completed and resulted: Yes  -vital signs normal or at patient baseline: Yes  -tolerating oral intake to maintain hydration: Yes   -adequate pain control on oral analgesics: No, requiring IV pain meds  -tolerating oral antibiotics or has plans for home infusion setup: No, pt has no scheduled antibiotics  -infection is improving: N/A  -returns to baseline functional status: NO, SBA  -safe disposition plan has been identified: Yes

## 2017-03-22 NOTE — PLAN OF CARE
Problem: Discharge Planning  Goal: Discharge Planning (Adult, OB, Behavioral, Peds)  Outpatient/Observation goals to be met before discharge home:     -diagnostic tests and consults completed and resulted: Yes  -vital signs normal or at patient baseline: Yes  -tolerating oral intake to maintain hydration: Yes, transitioned to regular diet. Will monitor how well tolerated.   -adequate pain control on oral analgesics: Yes.  -tolerating oral antibiotics or has plans for home infusion setup: No, pt has no scheduled antibiotics  -infection is improving: N/A  -returns to baseline functional status: Yes  -safe disposition plan has been identified: Yes  Patient reports pain well controlled with oral oxycodone.

## 2017-03-22 NOTE — PLAN OF CARE
Problem: Discharge Planning  Goal: Discharge Planning (Adult, OB, Behavioral, Peds)  -diagnostic tests and consults completed and resulted: Yes  -vital signs normal or at patient baseline: Yes  -tolerating oral intake to maintain hydration: Yes, full liquid diet  -adequate pain control on oral analgesics: No, requiring IV pain meds  -tolerating oral antibiotics or has plans for home infusion setup: No, pt has no scheduled antibiotics  -infection is improving: N/A  -returns to baseline functional status: Yes  -safe disposition plan has been identified: Yes  Patient c/o increased pain. Provider updated and ordered X1 dose of oxycodone with reported relief. Patient also given ultram and IV dilaudid. Up to the bathroom independently. Able to make needs known.

## 2017-03-22 NOTE — PROGRESS NOTES
"Surgery Progress note    S:  GAGE overnight. Abdominal pain this morning. Was not getting PO pain meds over night. Denies nausea/vomiting.    O:  /67 (BP Location: Right arm)  Pulse 61  Temp 98.6  F (37  C) (Oral)  Resp 16  Ht 1.626 m (5' 4.02\")  Wt 95.3 kg (210 lb)  SpO2 96%  Breastfeeding? No  BMI 36.03 kg/m2  A&Ox3, NAD laying in bed  Breathing non-labored on RA  Warm well perfused  Soft, obese, ND, diffusely moderately tender, non peritonitic, non tympanic  Distal extremities warm.       Intake/Output Summary (Last 24 hours) at 03/22/17 0914  Last data filed at 03/22/17 0600   Gross per 24 hour   Intake             2575 ml   Output              170 ml   Net             2405 ml         BMP  Recent Labs  Lab 03/20/17 1942 03/20/17  1310 03/20/17  0612    138 142   POTASSIUM 3.5 3.8 3.8   CHLORIDE 109 106 109   FRANCINE 8.3* 8.2* 9.3   CO2 23 22 23   BUN 5* 6* 9   CR 0.65 0.56 0.67   * 122* 106*     CBC  Recent Labs  Lab 03/20/17 1942 03/20/17  1310 03/20/17  0612   WBC 15.4* 18.1* 11.4*   RBC 4.31 4.30 4.57   HGB 13.2 13.3 14.3   HCT 38.9 39.1 41.4   MCV 90 91 91   MCH 30.6 30.9 31.3   MCHC 33.9 34.0 34.5   RDW 12.7 12.8 12.4    322 376     INR  Recent Labs  Lab 03/20/17  1310 03/20/17  0612   INR 1.05 1.00      Liver Function Studies -   Recent Labs   Lab Test  03/20/17 1942   PROTTOTAL  6.8   ALBUMIN  3.5   BILITOTAL  0.5   ALKPHOS  91   AST  6   ALT  21          A/P: 27F s/p laparoscopic cholecystectomy 3/21. Having some pain but overall doing well.    -regular diet  -d/c IVF  -PO pain control  -consider discharge later today of tolerating regular diet.          Damien Duncan MD  General Surgery  [phone]      "

## 2017-03-22 NOTE — PLAN OF CARE
Problem: Discharge Planning  Goal: Discharge Planning (Adult, OB, Behavioral, Peds)  -diagnostic tests and consults completed and resulted: Yes  -vital signs normal or at patient baseline: Yes  -tolerating oral intake to maintain hydration: Yes   -adequate pain control on oral analgesics: No, requiring IV pain meds  -tolerating oral antibiotics or has plans for home infusion setup: No, pt has no scheduled antibiotics  -infection is improving: N/A  -returns to baseline functional status: Yes  -safe disposition plan has been identified: Yes

## 2017-03-23 ENCOUNTER — CARE COORDINATION (OUTPATIENT)
Dept: SURGERY | Facility: CLINIC | Age: 27
End: 2017-03-23

## 2017-03-23 LAB — COPATH REPORT: NORMAL

## 2017-03-23 NOTE — PROGRESS NOTES
RN Post Op Care Coordination Note    Ms. Candi Saez is a 27 year old female who underwent Laparoscopic cholecystectomy on 3/21 with  Dr. Lizzy Castillo for a history of acute cholecystitis.  Spoke with Patient.    Support  Patient able to care for self independently     Health Status  Fevers/chills: Patient denies any fever or chills.  Nausea/Vomiting: Patient denies nausea/vomiting.  Eating/drinking: Patient is able to eat and drink without any complaints.  Bowel habits: Patient reports no bowel movement since surgery. She is passing gas and feels a little bloated. Discussed taking Miralax 1-2 times daily.  Urinary: Voiding normally  Drains (ZI): N/A  Incisions: Patient denies any signs and symptoms of infection.  Pain: patient is taking 2 Norco Q4-6H. Discussed using Ibuprofen as well to help with inflammation.    Activity/Restrictions  Following restrictions outlined by surgeon.      Pathology reviewed with patient:  No: pending    All of her questions were answered including reviewing restrictions, pathology, and wound care.  She will call this office if she has any further questions and/or concerns.      In lieu of a post-op clinic patient that patient would like to be contacted in approximately 10 days via telephone.    Whom and When to Call  Patient acknowledges understanding of how to manage any medication changes and when to seek medical care.     Patient advised that if after hour medical concerns arise to please call [phone] and choose option 4 to speak to the physician on call.     A copy of this note was routed to the primary surgeon.

## 2017-03-26 LAB
BACTERIA SPEC CULT: NO GROWTH
BACTERIA SPEC CULT: NO GROWTH
MICRO REPORT STATUS: NORMAL
MICRO REPORT STATUS: NORMAL
SPECIMEN SOURCE: NORMAL
SPECIMEN SOURCE: NORMAL

## 2017-03-31 ENCOUNTER — CARE COORDINATION (OUTPATIENT)
Dept: SURGERY | Facility: CLINIC | Age: 27
End: 2017-03-31

## 2017-03-31 NOTE — PROGRESS NOTES
Kingsley Trevino is a patient of Dr. Lizzy Vincent that recently underwent a surgical procedure (3/21 Lap puma).  The patient was contacted via telephone approximately 10 days ago for a status update and post-op teaching.  In lieu of a clinic visit, that patient requested to be contacted at a later date by an RN for an assessment.    Attempted to contact patient via telephone for a status update.  LM on VM to call office.        Patient Name: KINGSLEY TREVINO   MR#: [id number]   Specimen #: [id number]   Collected: 3/21/2017   Received: 3/21/2017   Reported: 3/23/2017 17:33   Ordering Phy(s): LIZZY VINCENT     For improved result formatting, select 'View Enhanced Report Format'   under Linked Documents section.     SPECIMEN(S):   Gallbladder and contents     FINAL DIAGNOSIS:   GALLBLADDER AND CONTENTS, CHOLECYSTECTOMY:   - Acute and chronic cholecystitis, cholelithiasis   - No evidence of dysplasia or malignancy     I have personally reviewed all specimens and or slides, including the   listed special stains, and used them with my medical judgement to   determine the final diagnosis.     Electronically signed out by:   Rigo Hull M.D

## 2017-03-31 NOTE — LETTER
Patient:  Kingsley Trevino  :   1990  MRN:     [id number]        Ms.Tiara Jaci Trevino  [address] [facility] [address]        2017    Dear ,    We are writing to inform you of your test results following your recent gallbladder surgery. Pathology shows acute and chronic cholecystitis, cholelithiasis. This means you had a chronically inflamed gallbladder with gallstones. There was no evidence of dysplasia or malignancy. This is a normal finding after having your gallbladder removed. Please contact the General Surgery Clinic with questions or concerns at [phone], option [phone].    Sincerely,        Dr. Lizzy Vincent                            Patient Name: KINGSLEY TREVINO   MR#: [id number]   Specimen #: [id number]   Collected: 3/21/2017   Received: 3/21/2017   Reported: 3/23/2017 17:33   Ordering Phy(s): LIZZY VINCENT     For improved result formatting, select 'View Enhanced Report Format'   under Linked Documents section.     SPECIMEN(S):   Gallbladder and contents     FINAL DIAGNOSIS:   GALLBLADDER AND CONTENTS, CHOLECYSTECTOMY:   - Acute and chronic cholecystitis, cholelithiasis   - No evidence of dysplasia or malignancy     I have personally reviewed all specimens and or slides, including the   listed special stains, and used them with my medical judgement to   determine the final diagnosis.     Electronically signed out by:   Rigo Hull M.D

## 2017-12-18 ENCOUNTER — APPOINTMENT (OUTPATIENT)
Dept: CT IMAGING | Facility: CLINIC | Age: 27
End: 2017-12-18
Attending: EMERGENCY MEDICINE
Payer: COMMERCIAL

## 2017-12-18 ENCOUNTER — HOSPITAL ENCOUNTER (EMERGENCY)
Facility: CLINIC | Age: 27
Discharge: HOME | End: 2017-12-18
Attending: EMERGENCY MEDICINE | Admitting: EMERGENCY MEDICINE
Payer: COMMERCIAL

## 2017-12-18 ENCOUNTER — APPOINTMENT (OUTPATIENT)
Dept: ULTRASOUND IMAGING | Facility: CLINIC | Age: 27
End: 2017-12-18
Attending: EMERGENCY MEDICINE
Payer: COMMERCIAL

## 2017-12-18 VITALS
WEIGHT: 200 LBS | RESPIRATION RATE: 16 BRPM | TEMPERATURE: 98.3 F | BODY MASS INDEX: 34.31 KG/M2 | HEART RATE: 89 BPM | DIASTOLIC BLOOD PRESSURE: 91 MMHG | OXYGEN SATURATION: 98 % | SYSTOLIC BLOOD PRESSURE: 123 MMHG

## 2017-12-18 DIAGNOSIS — R10.84 ABDOMINAL PAIN, GENERALIZED: ICD-10-CM

## 2017-12-18 LAB
ABO + RH BLD: NORMAL
ABO + RH BLD: NORMAL
ALBUMIN SERPL-MCNC: 4 G/DL (ref 3.4–5)
ALBUMIN UR-MCNC: 10 MG/DL
ALP SERPL-CCNC: 100 U/L (ref 40–150)
ALT SERPL W P-5'-P-CCNC: 55 U/L (ref 0–50)
ANION GAP SERPL CALCULATED.3IONS-SCNC: 9 MMOL/L (ref 3–14)
APPEARANCE UR: CLEAR
APTT PPP: 27 SEC (ref 22–37)
AST SERPL W P-5'-P-CCNC: 24 U/L (ref 0–45)
BACTERIA #/AREA URNS HPF: ABNORMAL /HPF
BASOPHILS # BLD AUTO: 0 10E9/L (ref 0–0.2)
BASOPHILS NFR BLD AUTO: 0.1 %
BILIRUB SERPL-MCNC: 0.4 MG/DL (ref 0.2–1.3)
BILIRUB UR QL STRIP: NEGATIVE
BLD GP AB SCN SERPL QL: NORMAL
BLOOD BANK CMNT PATIENT-IMP: NORMAL
BUN SERPL-MCNC: 12 MG/DL (ref 7–30)
CALCIUM SERPL-MCNC: 9.3 MG/DL (ref 8.5–10.1)
CHLORIDE SERPL-SCNC: 109 MMOL/L (ref 94–109)
CO2 SERPL-SCNC: 24 MMOL/L (ref 20–32)
COLOR UR AUTO: YELLOW
CREAT SERPL-MCNC: 0.61 MG/DL (ref 0.52–1.04)
DIFFERENTIAL METHOD BLD: NORMAL
EOSINOPHIL # BLD AUTO: 0.1 10E9/L (ref 0–0.7)
EOSINOPHIL NFR BLD AUTO: 1.5 %
ERYTHROCYTE [DISTWIDTH] IN BLOOD BY AUTOMATED COUNT: 13.1 % (ref 10–15)
GFR SERPL CREATININE-BSD FRML MDRD: >90 ML/MIN/1.7M2
GLUCOSE SERPL-MCNC: 91 MG/DL (ref 70–99)
GLUCOSE UR STRIP-MCNC: NEGATIVE MG/DL
HCG SERPL QL: NEGATIVE
HCG UR QL: NEGATIVE
HCT VFR BLD AUTO: 42.1 % (ref 35–47)
HGB BLD-MCNC: 14.5 G/DL (ref 11.7–15.7)
HGB UR QL STRIP: NEGATIVE
IMM GRANULOCYTES # BLD: 0 10E9/L (ref 0–0.4)
IMM GRANULOCYTES NFR BLD: 0.3 %
INR PPP: 0.93 (ref 0.86–1.14)
KETONES UR STRIP-MCNC: NEGATIVE MG/DL
LACTATE BLD-SCNC: 1 MMOL/L (ref 0.7–2)
LEUKOCYTE ESTERASE UR QL STRIP: NEGATIVE
LYMPHOCYTES # BLD AUTO: 2.1 10E9/L (ref 0.8–5.3)
LYMPHOCYTES NFR BLD AUTO: 28.6 %
MCH RBC QN AUTO: 31.3 PG (ref 26.5–33)
MCHC RBC AUTO-ENTMCNC: 34.4 G/DL (ref 31.5–36.5)
MCV RBC AUTO: 91 FL (ref 78–100)
MONOCYTES # BLD AUTO: 0.3 10E9/L (ref 0–1.3)
MONOCYTES NFR BLD AUTO: 4.2 %
MUCOUS THREADS #/AREA URNS LPF: PRESENT /LPF
NEUTROPHILS # BLD AUTO: 4.8 10E9/L (ref 1.6–8.3)
NEUTROPHILS NFR BLD AUTO: 65.3 %
NITRATE UR QL: NEGATIVE
NRBC # BLD AUTO: 0 10*3/UL
NRBC BLD AUTO-RTO: 0 /100
PH UR STRIP: 8 PH (ref 5–7)
PLATELET # BLD AUTO: 341 10E9/L (ref 150–450)
POTASSIUM SERPL-SCNC: 4.1 MMOL/L (ref 3.4–5.3)
PROT SERPL-MCNC: 8 G/DL (ref 6.8–8.8)
RBC # BLD AUTO: 4.63 10E12/L (ref 3.8–5.2)
RBC #/AREA URNS AUTO: 2 /HPF (ref 0–2)
SODIUM SERPL-SCNC: 142 MMOL/L (ref 133–144)
SOURCE: ABNORMAL
SP GR UR STRIP: 1.02 (ref 1–1.03)
SPECIMEN EXP DATE BLD: NORMAL
SQUAMOUS #/AREA URNS AUTO: 1 /HPF (ref 0–1)
UROBILINOGEN UR STRIP-MCNC: NORMAL MG/DL (ref 0–2)
WBC # BLD AUTO: 7.4 10E9/L (ref 4–11)
WBC #/AREA URNS AUTO: 1 /HPF (ref 0–2)

## 2017-12-18 PROCEDURE — 96361 HYDRATE IV INFUSION ADD-ON: CPT | Performed by: EMERGENCY MEDICINE

## 2017-12-18 PROCEDURE — 25000132 ZZH RX MED GY IP 250 OP 250 PS 637: Performed by: EMERGENCY MEDICINE

## 2017-12-18 PROCEDURE — 83605 ASSAY OF LACTIC ACID: CPT | Performed by: EMERGENCY MEDICINE

## 2017-12-18 PROCEDURE — 80053 COMPREHEN METABOLIC PANEL: CPT | Performed by: EMERGENCY MEDICINE

## 2017-12-18 PROCEDURE — 86901 BLOOD TYPING SEROLOGIC RH(D): CPT | Performed by: EMERGENCY MEDICINE

## 2017-12-18 PROCEDURE — 25000125 ZZHC RX 250: Performed by: EMERGENCY MEDICINE

## 2017-12-18 PROCEDURE — 99284 EMERGENCY DEPT VISIT MOD MDM: CPT | Mod: Z6 | Performed by: EMERGENCY MEDICINE

## 2017-12-18 PROCEDURE — 74177 CT ABD & PELVIS W/CONTRAST: CPT

## 2017-12-18 PROCEDURE — 96374 THER/PROPH/DIAG INJ IV PUSH: CPT | Performed by: EMERGENCY MEDICINE

## 2017-12-18 PROCEDURE — 81025 URINE PREGNANCY TEST: CPT | Performed by: EMERGENCY MEDICINE

## 2017-12-18 PROCEDURE — 85610 PROTHROMBIN TIME: CPT | Performed by: EMERGENCY MEDICINE

## 2017-12-18 PROCEDURE — 99285 EMERGENCY DEPT VISIT HI MDM: CPT | Mod: 25 | Performed by: EMERGENCY MEDICINE

## 2017-12-18 PROCEDURE — 81001 URINALYSIS AUTO W/SCOPE: CPT | Performed by: EMERGENCY MEDICINE

## 2017-12-18 PROCEDURE — 85025 COMPLETE CBC W/AUTO DIFF WBC: CPT | Performed by: EMERGENCY MEDICINE

## 2017-12-18 PROCEDURE — 93976 VASCULAR STUDY: CPT

## 2017-12-18 PROCEDURE — 25000128 H RX IP 250 OP 636: Performed by: EMERGENCY MEDICINE

## 2017-12-18 PROCEDURE — 84703 CHORIONIC GONADOTROPIN ASSAY: CPT | Performed by: EMERGENCY MEDICINE

## 2017-12-18 PROCEDURE — 85730 THROMBOPLASTIN TIME PARTIAL: CPT | Performed by: EMERGENCY MEDICINE

## 2017-12-18 PROCEDURE — 86850 RBC ANTIBODY SCREEN: CPT | Performed by: EMERGENCY MEDICINE

## 2017-12-18 PROCEDURE — 86900 BLOOD TYPING SEROLOGIC ABO: CPT | Performed by: EMERGENCY MEDICINE

## 2017-12-18 RX ORDER — ALUMINA, MAGNESIA, AND SIMETHICONE 2400; 2400; 240 MG/30ML; MG/30ML; MG/30ML
30 SUSPENSION ORAL EVERY 4 HOURS PRN
Qty: 1 BOTTLE | Refills: 0 | Status: SHIPPED | OUTPATIENT
Start: 2017-12-18

## 2017-12-18 RX ORDER — NAPROXEN 500 MG/1
500 TABLET ORAL ONCE
Status: COMPLETED | OUTPATIENT
Start: 2017-12-18 | End: 2017-12-18

## 2017-12-18 RX ORDER — NAPROXEN 500 MG/1
500 TABLET ORAL 2 TIMES DAILY WITH MEALS
Qty: 10 TABLET | Refills: 0 | Status: SHIPPED | OUTPATIENT
Start: 2017-12-18 | End: 2017-12-23

## 2017-12-18 RX ORDER — IOPAMIDOL 755 MG/ML
100 INJECTION, SOLUTION INTRAVASCULAR ONCE
Status: COMPLETED | OUTPATIENT
Start: 2017-12-18 | End: 2017-12-18

## 2017-12-18 RX ORDER — ALUMINA, MAGNESIA, AND SIMETHICONE 2400; 2400; 240 MG/30ML; MG/30ML; MG/30ML
30 SUSPENSION ORAL ONCE
Status: COMPLETED | OUTPATIENT
Start: 2017-12-18 | End: 2017-12-18

## 2017-12-18 RX ADMIN — HYDROMORPHONE HYDROCHLORIDE 1 MG: 1 INJECTION, SOLUTION INTRAMUSCULAR; INTRAVENOUS; SUBCUTANEOUS at 15:53

## 2017-12-18 RX ADMIN — NAPROXEN 500 MG: 500 TABLET ORAL at 19:48

## 2017-12-18 RX ADMIN — SODIUM CHLORIDE, POTASSIUM CHLORIDE, SODIUM LACTATE AND CALCIUM CHLORIDE 1000 ML: 600; 310; 30; 20 INJECTION, SOLUTION INTRAVENOUS at 17:01

## 2017-12-18 RX ADMIN — IOPAMIDOL 100 ML: 755 INJECTION, SOLUTION INTRAVENOUS at 19:07

## 2017-12-18 RX ADMIN — SODIUM CHLORIDE 65 ML: 9 INJECTION, SOLUTION INTRAVENOUS at 19:07

## 2017-12-18 RX ADMIN — ALUMINUM HYDROXIDE, MAGNESIUM HYDROXIDE, AND DIMETHICONE 30 ML: 400; 400; 40 SUSPENSION ORAL at 19:47

## 2017-12-18 ASSESSMENT — ENCOUNTER SYMPTOMS
HEMATURIA: 0
SORE THROAT: 0
ABDOMINAL PAIN: 1
MYALGIAS: 0
HEADACHES: 0
CHILLS: 0
CHEST TIGHTNESS: 0
SHORTNESS OF BREATH: 0
DYSURIA: 0
FATIGUE: 1
APPETITE CHANGE: 1
DIFFICULTY URINATING: 0
NAUSEA: 1
ARTHRALGIAS: 0
DIARRHEA: 1
FEVER: 0

## 2017-12-18 NOTE — ED AVS SNAPSHOT
[facility], [place], Emergency Department    [address]    [facility] [address]    Phone:  [phone]    Fax:  [phone]                                       Candi Saez   MRN: [id number]    Department:  [facility], Emergency Department   Date of Visit:  12/18/2017           After Visit Summary Signature Page     I have received my discharge instructions, and my questions have been answered. I have discussed any challenges I see with this plan with the nurse or doctor.    ..........................................................................................................................................  Patient/Patient Representative Signature      ..........................................................................................................................................  Patient Representative Print Name and Relationship to Patient    ..................................................               ................................................  Date                                            Time    ..........................................................................................................................................  Reviewed by Signature/Title    ...................................................              ..............................................  Date                                                            Time

## 2017-12-18 NOTE — ED AVS SNAPSHOT
[facility], Emergency Department    [address]    [facility] [address]    Phone:  [phone]    Fax:  [phone]                                       Candi Saez   MRN: [id number]    Department:  [facility] [place], Emergency Department   Date of Visit:  12/18/2017           Patient Information     Date Of Birth          1990        Your diagnoses for this visit were:     Abdominal pain, generalized        You were seen by Hawk Shane MD.      Follow-up Information     Follow up with Clinic, [facility] In 3 days.    Contact information:    [address]  [facility] [address]  [phone]          Discharge Instructions         Abdominal Pain    Abdominal pain is pain in the stomach or belly area. Everyone has this pain from time to time. In many cases it goes away on its own. But abdominal pain can sometimes be due to a serious problem, such as appendicitis. So it s important to know when to seek help.  Causes of abdominal pain  There are many possible causes of abdominal pain. Common causes in adults include:    Constipation, diarrhea, or gas    Stomach acid flowing back up into the esophagus (acid reflux or heartburn)    Severe acid reflux, called GERD (gastroesophageal reflux disease)    A sore in the lining of the stomach or small intestine (peptic ulcer)    Inflammation of the gallbladder, liver, or pancreas    Gallstones or kidney stones    Appendicitis     Intestinal blockage     An internal organ pushing through a muscle or other tissue (hernia)    Urinary tract infections    In women, menstrual cramps, fibroids, or endometriosis    Inflammation or infection of the intestines  Diagnosing the cause of abdominal pain  Your healthcare provider will do a physical exam help find the cause of your pain. If needed, tests will be ordered. Belly pain has many possible causes. So it can be hard to find the reason for your pain. Giving details about your pain can  help. Tell your provider where and when you feel the pain, and what makes it better or worse. Also let your provider know if you have other symptoms such as:    Fever    Tiredness    Upset stomach (nausea)    Vomiting    Changes in bathroom habits  Treating abdominal pain  Some causes of pain need emergency medical treatment right away. These include appendicitis or a bowel blockage. Other problems can be treated with rest, fluids, or medicines. Your healthcare provider can give you specific instructions for treatment or self-care based on what is causing your pain.  If you have vomiting or diarrhea, sip water or other clear fluids. When you are ready to eat solid foods again, start with small amounts of easy-to-digest, low-fat foods. These include apple sauce, toast, or crackers.   When to seek medical care  Call 911 or go to the hospital right away if you:    Can t pass stool and are vomiting    Are vomiting blood or have bloody diarrhea or black, tarry diarrhea    Have chest, neck, or shoulder pain    Feel like you might pass out    Have pain in your shoulder blades with nausea    Have sudden, severe belly pain    Have new, severe pain unlike any you have felt before    Have a belly that is rigid, hard, and tender to touch  Call your healthcare provider if you have:    Pain for more than 5 days    Bloating for more than 2 days    Diarrhea for more than 5 days    A fever of 100.4 F (38.0 C) or higher, or as directed by your provider    Pain that gets worse    Weight loss for no reason    Continued lack of appetite    Blood in your stool  How to prevent abdominal pain  Here are some tips to help prevent abdominal pain:    Eat smaller amounts of food at one time.    Avoid greasy, fried, or other high-fat foods.    Avoid foods that give you gas.    Exercise regularly.    Drink plenty of fluids.  To help prevent GERD symptoms:    Quit smoking.    Reduce alcohol and certain foods that increase stomach acid.    Avoid  aspirin and over-the-counter pain and fever medicines (NSAIDS or nonsteroidal anti-inflammatory drugs), if possible    Lose extra weight.    Finish eating at least 2 hours before you go to bed or lie down.    Raise the head of your bed.  Date Last Reviewed: 7/1/2016 2000-2017 [employer]. [address] [place] [address]. All rights reserved. This information is not intended as a substitute for professional medical care. Always follow your healthcare professional's instructions.          24 Hour Appointment Hotline       To make an appointment at any [facility], call [phone] ([phone]). If you don't have a family doctor or clinic, we will help you find one. [facility] are conveniently located to serve the needs of you and your family.             Review of your medicines      Our records show that you are taking the medicines listed below. If these are incorrect, please call your family doctor or clinic.        Dose / Directions Last dose taken    DEPO-ESTRADIOL IM        Inject into the muscle every 3 months   Refills:  0        diphenhydrAMINE HCl 50 MG Tabs   Dose:  25-50 mg        Take 25-50 mg by mouth every 6 hours as needed (allergies)   Refills:  0        ibuprofen 200 MG tablet   Commonly known as:  ADVIL/MOTRIN   Dose:  400 mg        Take 400 mg by mouth every 6 hours as needed for mild pain   Refills:  0        loratadine 10 MG tablet   Commonly known as:  CLARITIN   Dose:  10 mg        Take 10 mg by mouth daily as needed for allergies   Refills:  0        multivitamin, therapeutic with minerals Tabs tablet   Dose:  1 tablet        Take 1 tablet by mouth daily   Refills:  0                Procedures and tests performed during your visit     ABO/Rh type and screen    CBC with platelets differential    CT Abdomen Pelvis w Contrast    Comprehensive metabolic panel    HCG qualitative    HCG qualitative urine (UPT)    INR    Lactic acid whole blood    Partial  "thromboplastin time    UA reflex to Microscopic and Culture    US Pelvic Complete w Transvaginal & Abd/Pel Duplex Limited      Orders Needing Specimen Collection     None      Pending Results     Date and Time Order Name Status Description    2017 1709 CT Abdomen Pelvis w Contrast Preliminary     2017 1612 US Pelvic Complete w Transvaginal & Abd/Pel Duplex Limited Preliminary             Pending Culture Results     No orders found from 2017 to 2017.            Pending Results Instructions     If you had any lab results that were not finalized at the time of your Discharge, you can call the ED Lab Result RN at [phone]. You will be contacted by this team for any positive Lab results or changes in treatment. The nurses are available 7 days a week from 10A to 6:30P.  You can leave a message 24 hours per day and they will return your call.        Thank you for choosing [facility]       Thank you for choosing [facility] for your care. Our goal is always to provide you with excellent care. Hearing back from our patients is one way we can continue to improve our services. Please take a few minutes to complete the written survey that you may receive in the mail after you visit with us. Thank you!        [employer] Information     [employer] lets you send messages to your doctor, view your test results, renew your prescriptions, schedule appointments and more. To sign up, go to [email or web address] . Click on \"Log in\" on the left side of the screen, which will take you to the Welcome page. Then click on \"Sign up Now\" on the right side of the page.     You will be asked to enter the access code listed below, as well as some personal information. Please follow the directions to create your username and password.     Your access code is: [email or web address]  Expires: 3/18/2018  7:34 PM     Your access code will  in 90 days. If you need help or a new code, please call your [facility] clinic or [phone].   "      Care EveryWhere ID     This is your Care EveryWhere ID. This could be used by other organizations to access your [facility] medical records  [id number]        Equal Access to Services     ANDREW SILVEIRA : Slim Martel, jocelyn hardy, александр carroll, inna barnett. So [facility] [phone].    ATENCIÓN: Si habla español, tiene a hawthorne disposición servicios gratuitos de asistencia lingüística. Llame al [phone].    We comply with applicable federal civil rights laws and [place] laws. We do not discriminate on the basis of race, color, national origin, age, disability, sex, sexual orientation, or gender identity.            After Visit Summary       This is your record. Keep this with you and show to your community pharmacist(s) and doctor(s) at your next visit.

## 2017-12-18 NOTE — ED PROVIDER NOTES
History     Chief Complaint   Patient presents with     Abdominal Pain     Lower ABD right side: started a week ago: yesterday constant pain: no change in urinary     HPI  Candi Saez is a 27 year old female who presents with abdominal pain. Pain started a week ago with no known precipitating event. At first it was intermittently present but this morning when she woke up the pain was present, severe and has been constant since awakening. It is localized to her RLQ, doesn't radiate, feels like a sharp pain. Earlier in the week, she thinks the pain might have been a little higher up in RUQ but gradually shifted downward. Associated nausea for the past few days with poor appetite, dry heaving yesterday. Of note she has been having diarrhea the past few days, though she didn't note any specific odor or texture to the bowel movement. She also endorses generalized weakness and fatigue. No fevers or chills, no dysuria or hematuria. Patient is on Depo so periods are limited to occasional spotting that tends to occur approximately a month prior to her next injection. She is s/p laparoscopic cholecystectomy in March 2017.    I have reviewed the Medications, Allergies, Past Medical and Surgical History, and Social History in the Epic system.    Review of Systems   Constitutional: Positive for appetite change and fatigue. Negative for chills and fever.   HENT: Negative for congestion and sore throat.    Respiratory: Negative for chest tightness and shortness of breath.    Cardiovascular: Negative for chest pain and leg swelling.   Gastrointestinal: Positive for abdominal pain, diarrhea and nausea.   Genitourinary: Negative for difficulty urinating, dysuria and hematuria.   Musculoskeletal: Negative for arthralgias and myalgias.   Neurological: Negative for headaches.       Physical Exam   BP: (!) 127/92  Pulse: 91  Temp: 98.3  F (36.8  C)  Resp: 16  Weight: 90.7 kg (200 lb)  SpO2: 97 %      Physical Exam    Constitutional: She is oriented to person, place, and time. She appears well-developed and well-nourished. She appears distressed.   Young woman lying uncomfortably in hospital bed, tearful.   HENT:   Head: Normocephalic and atraumatic.   Cardiovascular: Normal rate and regular rhythm.  Exam reveals no gallop and no friction rub.    No murmur heard.  Pulmonary/Chest: Effort normal and breath sounds normal. No respiratory distress. She has no wheezes. She has no rales.   Abdominal: Soft. Bowel sounds are normal. She exhibits no distension and no mass. There is tenderness. There is no rebound and no guarding.       Musculoskeletal: She exhibits no edema or tenderness.   Neurological: She is alert and oriented to person, place, and time.   Skin: Skin is warm and dry.       ED Course     ED Course     Procedures             Critical Care time:  none             Labs Ordered and Resulted from Time of ED Arrival Up to the Time of Departure from the ED   COMPREHENSIVE METABOLIC PANEL - Abnormal; Notable for the following:        Result Value    ALT 55 (*)     All other components within normal limits   CBC WITH PLATELETS DIFFERENTIAL   INR   PARTIAL THROMBOPLASTIN TIME   LACTIC ACID WHOLE BLOOD   HCG QUALITATIVE   UA MACROSCOPIC WITH REFLEX TO MICRO AND CULTURE   HCG QUALITATIVE URINE   ABO/RH TYPE AND SCREEN            Assessments & Plan (with Medical Decision Making)     27 year old female who presents with abdominal pain for the past week, associated with diarrhea. Signs and symptoms concerning for possible appendicitis, TOA, ovarian torsion, gastroenteritis. IV established and labs sent which were unremarkable. CT A/P obtained which was normal. Pelvic ultrasound also obtained which was normal. Patient medicated with naproxen and maalox with adequate relief of her symptoms. Plan for outpatient PCP follow up.     I have reviewed the nursing notes.    I have reviewed the findings, diagnosis, plan and need for follow up  with the patient.    New Prescriptions    No medications on file       Final diagnoses:   Abdominal pain, generalized     Marcelino OZ Power MD  PGY-1 Psychiatry  12/18/2017   [facility], EMERGENCY DEPARTMENT    This data collected with the Resident working in the Emergency Department.  Patient was seen and evaluated by myself and I repeated the history and physical exam with the patient.  The plan of care was discussed with them.  The key portions of the note including the entire assessment and plan reflect my documentation.           Hawk Shane MD  12/21/17 0022

## 2017-12-19 NOTE — DISCHARGE INSTRUCTIONS
Please make an appointment to follow up with Your Primary Care Provider in 2-3 days.    Abdominal Pain    Abdominal pain is pain in the stomach or belly area. Everyone has this pain from time to time. In many cases it goes away on its own. But abdominal pain can sometimes be due to a serious problem, such as appendicitis. So it s important to know when to seek help.  Causes of abdominal pain  There are many possible causes of abdominal pain. Common causes in adults include:    Constipation, diarrhea, or gas    Stomach acid flowing back up into the esophagus (acid reflux or heartburn)    Severe acid reflux, called GERD (gastroesophageal reflux disease)    A sore in the lining of the stomach or small intestine (peptic ulcer)    Inflammation of the gallbladder, liver, or pancreas    Gallstones or kidney stones    Appendicitis     Intestinal blockage     An internal organ pushing through a muscle or other tissue (hernia)    Urinary tract infections    In women, menstrual cramps, fibroids, or endometriosis    Inflammation or infection of the intestines  Diagnosing the cause of abdominal pain  Your healthcare provider will do a physical exam help find the cause of your pain. If needed, tests will be ordered. Belly pain has many possible causes. So it can be hard to find the reason for your pain. Giving details about your pain can help. Tell your provider where and when you feel the pain, and what makes it better or worse. Also let your provider know if you have other symptoms such as:    Fever    Tiredness    Upset stomach (nausea)    Vomiting    Changes in bathroom habits  Treating abdominal pain  Some causes of pain need emergency medical treatment right away. These include appendicitis or a bowel blockage. Other problems can be treated with rest, fluids, or medicines. Your healthcare provider can give you specific instructions for treatment or self-care based on what is causing your pain.  If you have vomiting or  diarrhea, sip water or other clear fluids. When you are ready to eat solid foods again, start with small amounts of easy-to-digest, low-fat foods. These include apple sauce, toast, or crackers.   When to seek medical care  Call 911 or go to the hospital right away if you:    Can t pass stool and are vomiting    Are vomiting blood or have bloody diarrhea or black, tarry diarrhea    Have chest, neck, or shoulder pain    Feel like you might pass out    Have pain in your shoulder blades with nausea    Have sudden, severe belly pain    Have new, severe pain unlike any you have felt before    Have a belly that is rigid, hard, and tender to touch  Call your healthcare provider if you have:    Pain for more than 5 days    Bloating for more than 2 days    Diarrhea for more than 5 days    A fever of 100.4 F (38.0 C) or higher, or as directed by your provider    Pain that gets worse    Weight loss for no reason    Continued lack of appetite    Blood in your stool  How to prevent abdominal pain  Here are some tips to help prevent abdominal pain:    Eat smaller amounts of food at one time.    Avoid greasy, fried, or other high-fat foods.    Avoid foods that give you gas.    Exercise regularly.    Drink plenty of fluids.  To help prevent GERD symptoms:    Quit smoking.    Reduce alcohol and certain foods that increase stomach acid.    Avoid aspirin and over-the-counter pain and fever medicines (NSAIDS or nonsteroidal anti-inflammatory drugs), if possible    Lose extra weight.    Finish eating at least 2 hours before you go to bed or lie down.    Raise the head of your bed.  Date Last Reviewed: 7/1/2016 2000-2017 [employer]. [address] [place] [address], [place] [address]. All rights reserved. This information is not intended as a substitute for professional medical care. Always follow your healthcare professional's instructions.

## 2021-06-12 ENCOUNTER — HOSPITAL ENCOUNTER (EMERGENCY)
Facility: CLINIC | Age: 31
Discharge: HOME | End: 2021-06-12
Attending: EMERGENCY MEDICINE | Admitting: EMERGENCY MEDICINE

## 2021-06-12 VITALS
OXYGEN SATURATION: 100 % | RESPIRATION RATE: 20 BRPM | TEMPERATURE: 98.4 F | SYSTOLIC BLOOD PRESSURE: 113 MMHG | DIASTOLIC BLOOD PRESSURE: 60 MMHG | HEART RATE: 93 BPM

## 2021-06-12 DIAGNOSIS — D50.9 IRON DEFICIENCY ANEMIA, UNSPECIFIED IRON DEFICIENCY ANEMIA TYPE: ICD-10-CM

## 2021-06-12 LAB
ALBUMIN SERPL-MCNC: 3.6 G/DL (ref 3.4–5)
ALBUMIN UR-MCNC: NEGATIVE MG/DL
ALP SERPL-CCNC: 98 U/L (ref 40–150)
ALT SERPL W P-5'-P-CCNC: 16 U/L (ref 0–50)
AMPHETAMINES UR QL SCN: POSITIVE
ANION GAP SERPL CALCULATED.3IONS-SCNC: 7 MMOL/L (ref 3–14)
APPEARANCE UR: CLEAR
AST SERPL W P-5'-P-CCNC: 20 U/L (ref 0–45)
BARBITURATES UR QL: NEGATIVE
BASOPHILS # BLD AUTO: 0 10E9/L (ref 0–0.2)
BASOPHILS NFR BLD AUTO: 0.4 %
BENZODIAZ UR QL: NEGATIVE
BILIRUB SERPL-MCNC: 0.3 MG/DL (ref 0.2–1.3)
BILIRUB UR QL STRIP: NEGATIVE
BUN SERPL-MCNC: 12 MG/DL (ref 7–30)
CALCIUM SERPL-MCNC: 8.5 MG/DL (ref 8.5–10.1)
CANNABINOIDS UR QL SCN: NEGATIVE
CHLORIDE SERPL-SCNC: 110 MMOL/L (ref 94–109)
CO2 SERPL-SCNC: 24 MMOL/L (ref 20–32)
COCAINE UR QL: POSITIVE
COLOR UR AUTO: ABNORMAL
CREAT SERPL-MCNC: 0.83 MG/DL (ref 0.52–1.04)
DIFFERENTIAL METHOD BLD: ABNORMAL
EOSINOPHIL # BLD AUTO: 0.2 10E9/L (ref 0–0.7)
EOSINOPHIL NFR BLD AUTO: 2.4 %
ERYTHROCYTE [DISTWIDTH] IN BLOOD BY AUTOMATED COUNT: 18.2 % (ref 10–15)
ETHANOL SERPL-MCNC: <0.01 G/DL
ETHANOL UR QL SCN: NEGATIVE
GFR SERPL CREATININE-BSD FRML MDRD: >90 ML/MIN/{1.73_M2}
GLUCOSE SERPL-MCNC: 78 MG/DL (ref 70–99)
GLUCOSE UR STRIP-MCNC: NEGATIVE MG/DL
HCT VFR BLD AUTO: 29.3 % (ref 35–47)
HGB BLD-MCNC: 8.6 G/DL (ref 11.7–15.7)
HGB UR QL STRIP: NEGATIVE
IMM GRANULOCYTES # BLD: 0 10E9/L (ref 0–0.4)
IMM GRANULOCYTES NFR BLD: 0.3 %
IRON SATN MFR SERPL: 4 % (ref 15–46)
IRON SERPL-MCNC: 17 UG/DL (ref 35–180)
KETONES UR STRIP-MCNC: NEGATIVE MG/DL
LEUKOCYTE ESTERASE UR QL STRIP: NEGATIVE
LIPASE SERPL-CCNC: 119 U/L (ref 73–393)
LYMPHOCYTES # BLD AUTO: 2.2 10E9/L (ref 0.8–5.3)
LYMPHOCYTES NFR BLD AUTO: 31.1 %
MCH RBC QN AUTO: 23.8 PG (ref 26.5–33)
MCHC RBC AUTO-ENTMCNC: 29.4 G/DL (ref 31.5–36.5)
MCV RBC AUTO: 81 FL (ref 78–100)
MONOCYTES # BLD AUTO: 0.4 10E9/L (ref 0–1.3)
MONOCYTES NFR BLD AUTO: 5.6 %
MUCOUS THREADS #/AREA URNS LPF: PRESENT /LPF
NEUTROPHILS # BLD AUTO: 4.3 10E9/L (ref 1.6–8.3)
NEUTROPHILS NFR BLD AUTO: 60.2 %
NITRATE UR QL: NEGATIVE
NRBC # BLD AUTO: 0 10*3/UL
NRBC BLD AUTO-RTO: 0 /100
OPIATES UR QL SCN: NEGATIVE
PH UR STRIP: 6 PH (ref 5–7)
PLATELET # BLD AUTO: 451 10E9/L (ref 150–450)
POTASSIUM SERPL-SCNC: 3.1 MMOL/L (ref 3.4–5.3)
PROT SERPL-MCNC: 7.4 G/DL (ref 6.8–8.8)
RBC # BLD AUTO: 3.61 10E12/L (ref 3.8–5.2)
RBC #/AREA URNS AUTO: 0 /HPF (ref 0–2)
SODIUM SERPL-SCNC: 141 MMOL/L (ref 133–144)
SOURCE: ABNORMAL
SP GR UR STRIP: 1.01 (ref 1–1.03)
SQUAMOUS #/AREA URNS AUTO: <1 /HPF (ref 0–1)
TIBC SERPL-MCNC: 450 UG/DL (ref 240–430)
TRANSFERRIN SERPL-MCNC: 342 MG/DL (ref 210–360)
UROBILINOGEN UR STRIP-MCNC: NORMAL MG/DL (ref 0–2)
WBC # BLD AUTO: 7.2 10E9/L (ref 4–11)
WBC #/AREA URNS AUTO: <1 /HPF (ref 0–5)

## 2021-06-12 PROCEDURE — 83690 ASSAY OF LIPASE: CPT | Performed by: EMERGENCY MEDICINE

## 2021-06-12 PROCEDURE — 85025 COMPLETE CBC W/AUTO DIFF WBC: CPT | Performed by: EMERGENCY MEDICINE

## 2021-06-12 PROCEDURE — 80307 DRUG TEST PRSMV CHEM ANLYZR: CPT | Performed by: EMERGENCY MEDICINE

## 2021-06-12 PROCEDURE — 258N000003 HC RX IP 258 OP 636: Performed by: EMERGENCY MEDICINE

## 2021-06-12 PROCEDURE — 80320 DRUG SCREEN QUANTALCOHOLS: CPT | Performed by: EMERGENCY MEDICINE

## 2021-06-12 PROCEDURE — 84466 ASSAY OF TRANSFERRIN: CPT | Performed by: EMERGENCY MEDICINE

## 2021-06-12 PROCEDURE — 81001 URINALYSIS AUTO W/SCOPE: CPT | Performed by: EMERGENCY MEDICINE

## 2021-06-12 PROCEDURE — 96365 THER/PROPH/DIAG IV INF INIT: CPT | Performed by: EMERGENCY MEDICINE

## 2021-06-12 PROCEDURE — 82077 ASSAY SPEC XCP UR&BREATH IA: CPT | Performed by: EMERGENCY MEDICINE

## 2021-06-12 PROCEDURE — 83540 ASSAY OF IRON: CPT | Performed by: EMERGENCY MEDICINE

## 2021-06-12 PROCEDURE — 250N000011 HC RX IP 250 OP 636: Performed by: EMERGENCY MEDICINE

## 2021-06-12 PROCEDURE — 80053 COMPREHEN METABOLIC PANEL: CPT | Performed by: EMERGENCY MEDICINE

## 2021-06-12 PROCEDURE — 83550 IRON BINDING TEST: CPT | Performed by: EMERGENCY MEDICINE

## 2021-06-12 PROCEDURE — 250N000013 HC RX MED GY IP 250 OP 250 PS 637: Performed by: EMERGENCY MEDICINE

## 2021-06-12 PROCEDURE — 99284 EMERGENCY DEPT VISIT MOD MDM: CPT | Mod: 25 | Performed by: EMERGENCY MEDICINE

## 2021-06-12 PROCEDURE — 99284 EMERGENCY DEPT VISIT MOD MDM: CPT | Performed by: EMERGENCY MEDICINE

## 2021-06-12 PROCEDURE — 96361 HYDRATE IV INFUSION ADD-ON: CPT | Performed by: EMERGENCY MEDICINE

## 2021-06-12 RX ORDER — FERROUS SULFATE 325(65) MG
325 TABLET ORAL
Qty: 90 TABLET | Refills: 0 | Status: SHIPPED | OUTPATIENT
Start: 2021-06-12

## 2021-06-12 RX ORDER — POTASSIUM CHLORIDE 20MEQ/15ML
40 LIQUID (ML) ORAL ONCE
Status: COMPLETED | OUTPATIENT
Start: 2021-06-12 | End: 2021-06-12

## 2021-06-12 RX ORDER — POTASSIUM CHLORIDE 7.45 MG/ML
10 INJECTION INTRAVENOUS ONCE
Status: COMPLETED | OUTPATIENT
Start: 2021-06-12 | End: 2021-06-12

## 2021-06-12 RX ORDER — FERROUS SULFATE 325(65) MG
325 TABLET ORAL ONCE
Status: COMPLETED | OUTPATIENT
Start: 2021-06-12 | End: 2021-06-12

## 2021-06-12 RX ADMIN — POTASSIUM CHLORIDE 40 MEQ: 20 SOLUTION ORAL at 11:25

## 2021-06-12 RX ADMIN — SODIUM CHLORIDE 1000 ML: 900 INJECTION, SOLUTION INTRAVENOUS at 10:22

## 2021-06-12 RX ADMIN — POTASSIUM CHLORIDE 10 MEQ: 7.46 INJECTION, SOLUTION INTRAVENOUS at 11:01

## 2021-06-12 RX ADMIN — FERROUS SULFATE TAB 325 MG (65 MG ELEMENTAL FE) 325 MG: 325 (65 FE) TAB at 14:54

## 2021-06-12 ASSESSMENT — ENCOUNTER SYMPTOMS
NECK STIFFNESS: 0
CONFUSION: 0
ABDOMINAL PAIN: 0
ARTHRALGIAS: 0
FATIGUE: 1
DIFFICULTY URINATING: 0
FEVER: 0
COLOR CHANGE: 0
EYE REDNESS: 0
HEADACHES: 0
WEAKNESS: 1
SHORTNESS OF BREATH: 0

## 2021-06-12 NOTE — CONSULTS
Care Management Follow Up    Length of Stay (days): 0    Expected Discharge Date:  6/12/2021     Concerns to be Addressed:       Patient plan of care discussed at interdisciplinary rounds: Unknown    Anticipated Discharge Disposition: [facility]     Patient/family educated on Medicare website which has current facility and service quality ratings:  N/A  Education Provided on the Discharge Plan:  Yes  Patient/Family in Agreement with the Plan:  Yes    Referrals Placed by CM/SW:  None  Private pay costs discussed: Not applicable    Additional Information:    SW attempted to speak with Candi at her bedside at 1335. Candi had been sleeping and was difficult to rouse. SW asked if she needed housing/shelter resources and she said yes. JUDAH told her that SW would research some shelters and come back later to speak with her.    JUDAH printed information on [facility]: ([phone]) for Candi, as well as contact information for several other shelters:  [employer] ([phone])  [facility] ([phone])  [facility] ([phone])  [facility] ([phone])      At 1420 met with attending MD Joey) and pt at her bedside. Candi made several phone calls, but was unable to secure a ride or a place to stay.  JUDAH called [facility] ([phone]) and was able to secure a shelter bed for Candi at [facility], with check-in between 4-7 pm.      At 1605 SW contacted [employer] ([phone]) and confirmed a ride for patient to [facility].  will call [phone] when on the way. JUDAH asked Jim (bedside RN) to contact JUDAH if patient is not picked up by 1800 as she needs to check-in at [facility] by 1900.    JUDAH will continue to follow as needed    YASMEEN Chaney, LGSW  ED/OBS   [facility] [place]  Phone: [phone]  Pager: [phone]

## 2021-06-12 NOTE — ED TRIAGE NOTES
Patient comes in by ambulance for weakness. Patient states she is homeless. Patient last drink was about 2 am. Patient states she went and got help because she felt like she was blacking out. Patient has noted slurred speech

## 2021-06-12 NOTE — ED NOTES
Bed: ED21  Expected date: 6/12/21  Expected time: 9:51 AM  Means of arrival: Ambulance  Comments:  Tiffanie 433    32 y/o Female    Weak and Lethargic  Possible dehydration    Triaged:  YELLOW

## 2021-06-12 NOTE — DISCHARGE INSTRUCTIONS
Thank you for coming to the [facility] Emergency Department.     Your fatigue is likely due to iron deficiency anemia. Today, start ferrous sulfate (iron supplement) 1 tablet 3 times daily. A prescription for this was sent to the [facility] outpatient pharmacy for you to . Please follow up with your primary care provider at the  Clinic as soon as possible.     Please continue to avoid alcohol use, and stop using other substances (amphetamine and cocaine). If you need help stopping substance use, call [facility] at [phone] to request an assessment for referral to chemical dependency treatment.

## 2021-06-12 NOTE — ED PROVIDER NOTES
"    [facility] EMERGENCY DEPARTMENT ([facility])  6/12/21  History     Chief Complaint   Patient presents with     Fatigue     The history is provided by the patient and medical records.     Candi Saez is a 31 year old female who presents to the Emergency Department for evaluation of weakness, and fatigue. Patient reports that she initially started feeling different yesterday. Of note, patient states that last night was the first nights that she had needed to sleep on the street. She reports that she was usually staying in a truck, but states that this recently became unavailable to her. Patient reports that she was drinking beer last night, and states that she had last drink at approximately 2 AM. She denies use of any other substances. She states that she has been sober for approximately 2 months from other substances. Patient reports that she has been intermittently seeing her stepsister, but states that [place] and has not seen her recently. Patient states that she had just finished her menstrual period, and states that this was slightly shorter than normal. She denies any unusual vaginal bleeding. No dysuria, hematuria. Patient reports her last meal was yesterday. Patient denies any recent falls or other trauma. She does endorse history of cholecystectomy, no other abdominal surgeries. No history of diabetes, kidney disease, or liver etiology.    I have reviewed the Medications, Allergies, Past Medical and Surgical History, and Social History in the [facility] system.  PAST MEDICAL HISTORY:   Past Medical History:   Diagnosis Date     Anemia      Asthma        PAST SURGICAL HISTORY:   Past Surgical History:   Procedure Laterality Date     LAPAROSCOPIC CHOLECYSTECTOMY N/A 3/21/2017    Procedure: LAPAROSCOPIC CHOLECYSTECTOMY;  Surgeon: Lizzy Castillo MD;  Location:  OR       Past medical history, past surgical history, medications, and allergies were " reviewed with the patient. Additional pertinent items: None    FAMILY HISTORY:   Family History   Problem Relation Age of Onset     Diabetes Paternal Grandmother      Hypertension Paternal Grandmother      Cerebrovascular Disease Paternal Grandmother        SOCIAL HISTORY:   Social History     Tobacco Use     Smoking status: Former Smoker     Years: 0.50     Smokeless tobacco: Never Used   Substance Use Topics     Alcohol use: Yes     Comment: occ     Social history was reviewed with the patient. Additional pertinent items: None      Patient's Medications   New Prescriptions    FERROUS SULFATE (FEROSUL) 325 (65 FE) MG TABLET    Take 1 tablet (325 mg) by mouth 3 times daily (with meals)   Previous Medications    ALUM & MAG HYDROXIDE-SIMETHICONE (MAALOX MAX) 400-400-40 MG/5ML SUSP SUSPENSION    Take 30 mLs by mouth every 4 hours as needed for indigestion    DIPHENHYDRAMINE HCL 50 MG TABS    Take 25-50 mg by mouth every 6 hours as needed (allergies)    ESTRADIOL CYPIONATE (DEPO-ESTRADIOL IM)    Inject into the muscle every 3 months     IBUPROFEN (ADVIL,MOTRIN) 200 MG TABLET    Take 400 mg by mouth every 6 hours as needed for mild pain    LORATADINE (CLARITIN) 10 MG TABLET    Take 10 mg by mouth daily as needed for allergies    MULTIVITAMIN, THERAPEUTIC WITH MINERALS (THERA-VIT-M) TABS    Take 1 tablet by mouth daily   Modified Medications    No medications on file   Discontinued Medications    No medications on file        No Known Allergies     Review of Systems   Constitutional: Positive for fatigue. Negative for fever.   HENT: Negative for congestion.    Eyes: Negative for redness.   Respiratory: Negative for shortness of breath.    Cardiovascular: Negative for chest pain.   Gastrointestinal: Negative for abdominal pain.   Genitourinary: Negative for difficulty urinating.   Musculoskeletal: Negative for arthralgias and neck stiffness.   Skin: Negative for color change.   Neurological: Positive for weakness. Negative  for headaches.   Psychiatric/Behavioral: Negative for confusion.   All other systems reviewed and are negative.        Physical Exam   BP: 110/75  Pulse: 92  Temp: 98.4  F (36.9  C)  Resp: (P) 20  SpO2: 100 %      Physical Exam  Vitals signs and nursing note reviewed.       Gen:A&Ox3, no acute distress  HEENT:PERRL, no facial tenderness or wounds, head atraumatic, oropharynx clear, mucous membranes moist, TMs clear bilaterally  Neck:no bony tenderness or step offs, no JVD, trachea midline  Back: no CVA tenderness, no midline bony tenderness  CV:RRR without murmurs  PULM:Clear to auscultation bilaterally  Abd:soft, nontender, nondistended. Bowel sounds present and normal  UE:No traumatic injuries, skin normal  LE:no traumatic injuries, skin normal, no LE edema.   Neuro:CN II-XII intact, strength 5/5 throughout, gait stable  Skin: no rashes or ecchymoses  ED Course   10:06 AM  The patient was seen and examined by Jeanie Morrissey MD in Room ED21.        Procedures      Results for orders placed or performed during the hospital encounter of 06/12/21 (from the past 24 hour(s))   CBC with platelets differential   Result Value Ref Range    WBC 7.2 4.0 - 11.0 10e9/L    RBC Count 3.61 (L) 3.8 - 5.2 10e12/L    Hemoglobin 8.6 (L) 11.7 - 15.7 g/dL    Hematocrit 29.3 (L) 35.0 - 47.0 %    MCV 81 78 - 100 fl    MCH 23.8 (L) 26.5 - 33.0 pg    MCHC 29.4 (L) 31.5 - 36.5 g/dL    RDW 18.2 (H) 10.0 - 15.0 %    Platelet Count 451 (H) 150 - 450 10e9/L    Diff Method Automated Method     % Neutrophils 60.2 %    % Lymphocytes 31.1 %    % Monocytes 5.6 %    % Eosinophils 2.4 %    % Basophils 0.4 %    % Immature Granulocytes 0.3 %    Nucleated RBCs 0 0 /100    Absolute Neutrophil 4.3 1.6 - 8.3 10e9/L    Absolute Lymphocytes 2.2 0.8 - 5.3 10e9/L    Absolute Monocytes 0.4 0.0 - 1.3 10e9/L    Absolute Eosinophils 0.2 0.0 - 0.7 10e9/L    Absolute Basophils 0.0 0.0 - 0.2 10e9/L    Abs Immature Granulocytes 0.0 0 - 0.4 10e9/L    Absolute Nucleated  RBC 0.0    Comprehensive metabolic panel   Result Value Ref Range    Sodium 141 133 - 144 mmol/L    Potassium 3.1 (L) 3.4 - 5.3 mmol/L    Chloride 110 (H) 94 - 109 mmol/L    Carbon Dioxide 24 20 - 32 mmol/L    Anion Gap 7 3 - 14 mmol/L    Glucose 78 70 - 99 mg/dL    Urea Nitrogen 12 7 - 30 mg/dL    Creatinine 0.83 0.52 - 1.04 mg/dL    GFR Estimate >90 >60 mL/min/[1.73_m2]    GFR Estimate If Black >90 >60 mL/min/[1.73_m2]    Calcium 8.5 8.5 - 10.1 mg/dL    Bilirubin Total 0.3 0.2 - 1.3 mg/dL    Albumin 3.6 3.4 - 5.0 g/dL    Protein Total 7.4 6.8 - 8.8 g/dL    Alkaline Phosphatase 98 40 - 150 U/L    ALT 16 0 - 50 U/L    AST 20 0 - 45 U/L   Lipase   Result Value Ref Range    Lipase 119 73 - 393 U/L   Alcohol level blood   Result Value Ref Range    Ethanol g/dL <0.01 <0.01 g/dL   Iron and iron binding capacity   Result Value Ref Range    Iron 17 (L) 35 - 180 ug/dL    Iron Binding Cap 450 (H) 240 - 430 ug/dL    Iron Saturation Index 4 (L) 15 - 46 %   Transferrin   Result Value Ref Range    Transferrin 342 210 - 360 mg/dL   UA with Microscopic reflex to Culture    Specimen: Urine Midstream; Midstream Urine   Result Value Ref Range    Color Urine Light Yellow     Appearance Urine Clear     Glucose Urine Negative NEG^Negative mg/dL    Bilirubin Urine Negative NEG^Negative    Ketones Urine Negative NEG^Negative mg/dL    Specific Gravity Urine 1.015 1.003 - 1.035    Blood Urine Negative NEG^Negative    pH Urine 6.0 5.0 - 7.0 pH    Protein Albumin Urine Negative NEG^Negative mg/dL    Urobilinogen mg/dL Normal 0.0 - 2.0 mg/dL    Nitrite Urine Negative NEG^Negative    Leukocyte Esterase Urine Negative NEG^Negative    Source Midstream Urine     WBC Urine <1 0 - 5 /HPF    RBC Urine 0 0 - 2 /HPF    Squamous Epithelial /HPF Urine <1 0 - 1 /HPF    Mucous Urine Present (A) NEG^Negative /LPF   Drug abuse screen 6 urine (tox)   Result Value Ref Range    Amphetamine Qual Urine Positive (A) NEG^Negative    Barbiturates Qual Urine Negative  NEG^Negative    Benzodiazepine Qual Urine Negative NEG^Negative    Cannabinoids Qual Urine Negative NEG^Negative    Cocaine Qual Urine Positive (A) NEG^Negative    Ethanol Qual Urine Negative NEG^Negative    Opiates Qualitative Urine Negative NEG^Negative     Medications   0.9% sodium chloride BOLUS (0 mLs Intravenous Stopped 6/12/21 1124)   potassium chloride 10 mEq in 100 mL sterile water intermittent infusion (premix) (0 mEq Intravenous Stopped 6/12/21 1119)   potassium chloride (KAYCIEL) solution 40 mEq (40 mEq Oral Given 6/12/21 1125)   ferrous sulfate (FEROSUL) tablet 325 mg (325 mg Oral Given 6/12/21 1454)             Assessments & Plan (with Medical Decision Making)   30 yo F presenting with fatigue and weakness.   Also indicated she has been using substances and recently homeless.   DDx included: Electrolyte abnormalities, anemia, UA, hypoglycemia, and post-stimulant sedation.     IV access obtained and lab testing done.   Monitored on pulse ox and telemetry without abnormalities noted.   CBC with WBC 7.2.  Hgb is 8.6. This anemia is new since last testing in our system in 2017 and [facility] in 2018. No other recent labs available.  Iron studies show iron deficiency. Pt reports heavy menses for 2-3 days at the beginning of her period, but no increase in bleeding frequency or duration. No hematemesis or melena.   Reports having to take iron in the past.   Will restart iron supplementation today. Recommending follow up with her primary care provider at [facility].     Pt seen by social work, due to homelessness and recent loss of her vehicle which was her place to sleep. Have arrange for pt to go to [facility].     I have reviewed the nursing notes.    I have reviewed the findings, diagnosis, plan and need for follow up with the patient.    New Prescriptions    FERROUS SULFATE (FEROSUL) 325 (65 FE) MG TABLET    Take 1 tablet (325 mg) by mouth 3 times daily (with meals)       Final diagnoses:   Iron deficiency  anemia, unspecified iron deficiency anemia type   I, Kamari Chavarria, am serving as a trained medical scribe to document services personally performed by Jeanie Morrissey MD, based on the provider's statements to me.      I, Jeanie Morrissey MD, was physically present and have reviewed and verified the accuracy of this note documented by Kamari Chavarria.     6/12/2021   [facility] EMERGENCY DEPARTMENT  MD KORINA Mcelroy Katrina Anne, MD  06/12/21 1503

## 2021-10-08 ENCOUNTER — HOSPITAL ENCOUNTER (EMERGENCY)
Facility: CLINIC | Age: 31
Discharge: HOME | End: 2021-10-08
Attending: INTERNAL MEDICINE | Admitting: INTERNAL MEDICINE

## 2021-10-08 VITALS
HEART RATE: 113 BPM | OXYGEN SATURATION: 100 % | SYSTOLIC BLOOD PRESSURE: 107 MMHG | DIASTOLIC BLOOD PRESSURE: 74 MMHG | RESPIRATION RATE: 18 BRPM | TEMPERATURE: 98.3 F

## 2021-10-08 DIAGNOSIS — Z53.20 REFUSAL OF TREATMENT BY PATIENT: ICD-10-CM

## 2021-10-08 DIAGNOSIS — T40.2X1A OPIOID OVERDOSE, ACCIDENTAL OR UNINTENTIONAL, INITIAL ENCOUNTER (H): ICD-10-CM

## 2021-10-08 PROCEDURE — 99283 EMERGENCY DEPT VISIT LOW MDM: CPT | Performed by: INTERNAL MEDICINE

## 2021-10-08 PROCEDURE — 99283 EMERGENCY DEPT VISIT LOW MDM: CPT

## 2021-10-08 RX ORDER — SODIUM CHLORIDE 9 MG/ML
INJECTION, SOLUTION INTRAVENOUS CONTINUOUS
Status: DISCONTINUED | OUTPATIENT
Start: 2021-10-08 | End: 2021-10-08 | Stop reason: HOSPADM

## 2021-10-08 NOTE — ED PROVIDER NOTES
"ED Provider Note  [facility]      History     Chief Complaint   Patient presents with     Drug Overdose     The history is provided by the patient.     Candi Saez is a 31 year old female who has a significant medical history of anemia, asthma, substance abuse who presents to the ED BIBA for drug overdose.  Per EMS, patient was with her family at a restaurant.  Patient went to the use the restroom and use fentanyl.  Family found patient unconscious in the bathroom and called 911.  Narcan was never used.  States that she is \"feeling horrible\".  Patient states that she \"almost \"overdosed on fentanyl today.  Patient said that \"they\" are trying to get patient is for her weight from her sister to prove a point.  Patient was upset that she was not able to buy to her sister.  Patient reports smoking \"one point\" of fentanyl.  Patient denies any other medical problems.  Patient denies taking medications.  Patient is requesting to leave as soon as possible so she can \"go run 5 miles\".    Past Medical History  Past Medical History:   Diagnosis Date     Anemia      Asthma      Past Surgical History:   Procedure Laterality Date     LAPAROSCOPIC CHOLECYSTECTOMY N/A 3/21/2017    Procedure: LAPAROSCOPIC CHOLECYSTECTOMY;  Surgeon: Lizzy Castillo MD;  Location: [facility] OR     naloxone (NARCAN) 4 MG/0.1ML nasal spray  alum & mag hydroxide-simethicone (MAALOX MAX) 400-400-40 MG/5ML SUSP suspension  diphenhydrAMINE HCl 50 MG TABS  Estradiol Cypionate (DEPO-ESTRADIOL IM)  ferrous sulfate (FEROSUL) 325 (65 Fe) MG tablet  ibuprofen (ADVIL,MOTRIN) 200 MG tablet  loratadine (CLARITIN) 10 MG tablet  multivitamin, therapeutic with minerals (THERA-VIT-M) TABS      No Known Allergies  Family History  Family History   Problem Relation Age of Onset     Diabetes Paternal Grandmother      Hypertension Paternal Grandmother      Cerebrovascular Disease Paternal Grandmother      Social History   Social " History     Tobacco Use     Smoking status: Former Smoker     Years: 0.50     Smokeless tobacco: Never Used   Substance Use Topics     Alcohol use: Yes     Comment: occ     Drug use: No      Past medical history, past surgical history, medications, allergies, family history, and social history were reviewed with the patient. No additional pertinent items.       Review of Systems   Unable to perform ROS: Acuity of condition   Constitutional: Negative for fever.   HENT: Negative for congestion.    Eyes: Negative for redness.   Respiratory: Negative for shortness of breath.    Cardiovascular: Negative for chest pain.   Gastrointestinal: Negative for abdominal pain.   Genitourinary: Negative for difficulty urinating.   Musculoskeletal: Negative for arthralgias and neck stiffness.   Skin: Negative for color change.   Neurological: Negative for headaches.   Psychiatric/Behavioral: Negative for confusion.   All other systems reviewed and are negative.    A complete review of systems was performed with pertinent positives and negatives noted in the HPI, and all other systems negative.    Physical Exam   BP: 110/74  Pulse: (!) 121  Temp: 98.3  F (36.8  C)  Resp: 18  SpO2: 99 %  Physical Exam  Vitals and nursing note reviewed.   Constitutional:       General: She is not in acute distress.     Appearance: She is not diaphoretic.   HENT:      Head: Atraumatic.      Mouth/Throat:      Pharynx: No oropharyngeal exudate.   Eyes:      General: No scleral icterus.     Pupils: Pupils are equal, round, and reactive to light.   Cardiovascular:      Rate and Rhythm: Regular rhythm. Tachycardia present.      Heart sounds: Normal heart sounds. No murmur heard.   No friction rub. No gallop.    Pulmonary:      Effort: Pulmonary effort is normal. No respiratory distress.      Breath sounds: Normal breath sounds. No stridor. No wheezing, rhonchi or rales.   Chest:      Chest wall: No tenderness.   Abdominal:      General: Abdomen is flat.  Bowel sounds are normal. There is no distension.      Palpations: Abdomen is soft.      Tenderness: There is no abdominal tenderness. There is no right CVA tenderness, left CVA tenderness, guarding or rebound.      Hernia: No hernia is present.   Musculoskeletal:         General: No tenderness.   Skin:     General: Skin is warm.      Findings: No rash.   Neurological:      General: No focal deficit present.      Cranial Nerves: No cranial nerve deficit.      Sensory: No sensory deficit.      Motor: No weakness.      Coordination: Coordination normal.      Gait: Gait normal.      Deep Tendon Reflexes: Reflexes normal.   Psychiatric:         Attention and Perception: Attention normal.         Mood and Affect: Mood is anxious. Affect is angry.         Speech: Speech is tangential.         Behavior: Behavior is agitated and aggressive.         Thought Content: Thought content is not paranoid or delusional. Thought content does not include homicidal or suicidal ideation.         ED Course     3:35 PM  The patient was seen and examined by Sanjuana Browning MD in Room ED05.     Procedures            No results found for any visits on 10/08/21.  Medications - No data to display     Assessments & Plan (with Medical Decision Making)  Accidental overdose of opioid-pt reports snorted fentanyl one point, did not required narcan, watched over one hour plus, keep threatening to go AMA, then went AMA. Also declined labs and other work up. The case was [facility] with  [facility] who recommended 6 hours of observation. Discharged AMA with narcan prescription, CD program referral.       I have reviewed the nursing notes. I have reviewed the findings, diagnosis, plan and need for follow up with the patient.    Discharge Medication List as of 10/8/2021  4:43 PM      START taking these medications    Details   naloxone (NARCAN) 4 MG/0.1ML nasal spray Spray 1 spray (4 mg) into one nostril alternating nostrils once as needed for opioid  reversal every 2-3 minutes until assistance arrives, Disp-0.2 mL, R-0, Local Print             Final diagnoses:   Opioid overdose, accidental or unintentional, initial encounter (H)     ICristopher, am serving as a trained medical scribe to document services personally performed by Sanjuana Browning MD, based on the provider's statements to me.      Sanjuana PATTERSON MD, was physically present and have reviewed and verified the accuracy of this note documented by Cristopher Hylton.   --  Sanjuana Browning Md  [facility] EMERGENCY DEPARTMENT  10/8/2021     Sanjuana Browning MD  10/09/21 0100

## 2021-10-08 NOTE — ED TRIAGE NOTES
Per EMS, Pt with Hx of drug use. Pt and her family were at restaurant. Pt went to bathroom and used Fentanyl. Family found her unconscious in the bathroom. Pt was pale and wake when EMS arrived. Pt improved with EMS. VSS. . Narcan never used.

## 2021-10-08 NOTE — DISCHARGE INSTRUCTIONS
Please follow up with [facility] [phone] as soon as possible if you change your mind to seek help.

## 2021-10-08 NOTE — ED NOTES
Bed: ED05  Expected date: 10/8/21  Expected time: 3:10 PM  Means of arrival: Ambulance  Comments:  31 F, Fentanyl  overdose, AxO, no narcan, [place] [id number]

## 2021-10-09 ASSESSMENT — ENCOUNTER SYMPTOMS
FEVER: 0
ARTHRALGIAS: 0
HEADACHES: 0
CONFUSION: 0
SHORTNESS OF BREATH: 0
NECK STIFFNESS: 0
COLOR CHANGE: 0
DIFFICULTY URINATING: 0
ABDOMINAL PAIN: 0
EYE REDNESS: 0

## (undated) DEVICE — ENDO SHEARS 5MM 176643

## (undated) DEVICE — LINEN TOWEL PACK X30 5481

## (undated) DEVICE — SOL WATER IRRIG 1000ML BOTTLE 2F7114

## (undated) DEVICE — Device

## (undated) DEVICE — SU MONOCRYL 4-0 PS-2 27" UND Y426H

## (undated) DEVICE — PREP CHLORAPREP 26ML TINTED ORANGE  260815

## (undated) DEVICE — LINEN TOWEL PACK X6 WHITE 5487

## (undated) DEVICE — ENDO DISSECTOR BLUNT 05MM 3/PK 173019

## (undated) DEVICE — ENDO TROCAR 05MM VERSAONE BLADELESS W/STD FIX CAN NONB5STF

## (undated) DEVICE — CLIP APPLIER ENDO 05MM MED/LG 176630

## (undated) DEVICE — SUCTION IRR STRYKERFLOW II W/TIP 250-070-520

## (undated) DEVICE — SOL NACL 0.9% INJ 1000ML BAG 07983-09

## (undated) DEVICE — CLIP APPLIER ENDO 5MM M/L LIGAMAX EL5ML

## (undated) DEVICE — ESU GROUND PAD ADULT W/CORD E7507

## (undated) DEVICE — LIGHT HANDLE X1 31140133

## (undated) DEVICE — SU DERMABOND ADVANCED .7ML DNX12

## (undated) DEVICE — SU VICRYL 0 UR-6 27" J603H

## (undated) DEVICE — GLOVE SENSICARE PI POWDER FREE 6.5 LATEX FREE MSG9065

## (undated) DEVICE — ENDO CANNULA 05MM VERSAONE UNIVERSAL UNVCA5STF

## (undated) DEVICE — ENDO POUCH GOLD 10MM ECATCH 173050G

## (undated) DEVICE — ENDO TROCAR BLUNT 100MM W/THRD ANCHOR BLUNTPORT BPT12STS

## (undated) DEVICE — SYR 30ML LL W/O NDL 302832

## (undated) DEVICE — ANTIFOG SOLUTION W/FOAM PAD 31142527

## (undated) DEVICE — ESU PENCIL W/COATED BLADE E2450H

## (undated) DEVICE — COVER CAMERA IN-LIGHT DISP LT-C02

## (undated) RX ORDER — FENTANYL CITRATE 50 UG/ML
INJECTION, SOLUTION INTRAMUSCULAR; INTRAVENOUS
Status: DISPENSED
Start: 2017-03-21

## (undated) RX ORDER — SODIUM CHLORIDE 9 MG/ML
INJECTION, SOLUTION INTRAVENOUS
Status: DISPENSED
Start: 2017-03-21

## (undated) RX ORDER — HYDROMORPHONE HYDROCHLORIDE 1 MG/ML
INJECTION, SOLUTION INTRAMUSCULAR; INTRAVENOUS; SUBCUTANEOUS
Status: DISPENSED
Start: 2017-03-21

## (undated) RX ORDER — BUPIVACAINE HYDROCHLORIDE AND EPINEPHRINE 2.5; 5 MG/ML; UG/ML
INJECTION, SOLUTION EPIDURAL; INFILTRATION; INTRACAUDAL; PERINEURAL
Status: DISPENSED
Start: 2017-03-21